# Patient Record
Sex: MALE | Race: WHITE | Employment: FULL TIME | URBAN - METROPOLITAN AREA
[De-identification: names, ages, dates, MRNs, and addresses within clinical notes are randomized per-mention and may not be internally consistent; named-entity substitution may affect disease eponyms.]

---

## 2017-03-21 ENCOUNTER — OFFICE VISIT (OUTPATIENT)
Dept: FAMILY MEDICINE CLINIC | Age: 52
End: 2017-03-21
Payer: COMMERCIAL

## 2017-03-21 VITALS
TEMPERATURE: 98.4 F | HEART RATE: 91 BPM | HEIGHT: 74 IN | DIASTOLIC BLOOD PRESSURE: 78 MMHG | BODY MASS INDEX: 27.46 KG/M2 | WEIGHT: 214 LBS | SYSTOLIC BLOOD PRESSURE: 136 MMHG

## 2017-03-21 DIAGNOSIS — E78.5 HYPERLIPIDEMIA, UNSPECIFIED HYPERLIPIDEMIA TYPE: ICD-10-CM

## 2017-03-21 DIAGNOSIS — J44.9 CHRONIC OBSTRUCTIVE PULMONARY DISEASE, UNSPECIFIED COPD TYPE (HCC): Primary | ICD-10-CM

## 2017-03-21 PROCEDURE — 99203 OFFICE O/P NEW LOW 30 MIN: CPT | Performed by: FAMILY MEDICINE

## 2017-03-21 RX ORDER — ALBUTEROL SULFATE 90 UG/1
2 AEROSOL, METERED RESPIRATORY (INHALATION) 2 TIMES DAILY PRN
Qty: 1 INHALER | Refills: 1 | Status: SHIPPED | OUTPATIENT
Start: 2017-03-21 | End: 2018-03-15 | Stop reason: SDUPTHER

## 2017-03-21 ASSESSMENT — PATIENT HEALTH QUESTIONNAIRE - PHQ9
2. FEELING DOWN, DEPRESSED OR HOPELESS: 0
SUM OF ALL RESPONSES TO PHQ QUESTIONS 1-9: 0
SUM OF ALL RESPONSES TO PHQ9 QUESTIONS 1 & 2: 0
1. LITTLE INTEREST OR PLEASURE IN DOING THINGS: 0

## 2017-03-21 ASSESSMENT — ENCOUNTER SYMPTOMS
SORE THROAT: 0
SHORTNESS OF BREATH: 1
NAUSEA: 0
ABDOMINAL PAIN: 0
CONSTIPATION: 0
COUGH: 0
WHEEZING: 0

## 2017-03-23 ENCOUNTER — HOSPITAL ENCOUNTER (OUTPATIENT)
Dept: PULMONOLOGY | Age: 52
Discharge: HOME | End: 2017-03-23
Payer: MEDICARE

## 2017-03-23 ENCOUNTER — HOSPITAL ENCOUNTER (OUTPATIENT)
Dept: GENERAL RADIOLOGY | Age: 52
Discharge: HOME | End: 2017-03-23
Payer: MEDICARE

## 2017-03-23 DIAGNOSIS — J44.9 CHRONIC OBSTRUCTIVE PULMONARY DISEASE, UNSPECIFIED COPD TYPE (HCC): ICD-10-CM

## 2017-03-23 DIAGNOSIS — E78.5 HYPERLIPIDEMIA, UNSPECIFIED HYPERLIPIDEMIA TYPE: ICD-10-CM

## 2017-03-23 LAB
ALT SERPL-CCNC: 27 U/L (ref 5–41)
ANION GAP SERPL CALCULATED.3IONS-SCNC: 15 MMOL/L (ref 9–17)
AST SERPL-CCNC: 20 U/L
BUN BLDV-MCNC: 16 MG/DL (ref 6–20)
BUN/CREAT BLD: ABNORMAL (ref 9–20)
CALCIUM SERPL-MCNC: 9.5 MG/DL (ref 8.6–10.4)
CHLORIDE BLD-SCNC: 100 MMOL/L (ref 98–107)
CHOLESTEROL/HDL RATIO: 6.1
CHOLESTEROL: 303 MG/DL
CO2: 26 MMOL/L (ref 20–31)
CREAT SERPL-MCNC: 0.95 MG/DL (ref 0.7–1.2)
GFR AFRICAN AMERICAN: >60 ML/MIN
GFR NON-AFRICAN AMERICAN: >60 ML/MIN
GFR SERPL CREATININE-BSD FRML MDRD: ABNORMAL ML/MIN/{1.73_M2}
GFR SERPL CREATININE-BSD FRML MDRD: ABNORMAL ML/MIN/{1.73_M2}
GLUCOSE BLD-MCNC: 117 MG/DL (ref 70–99)
HDLC SERPL-MCNC: 50 MG/DL
LDL CHOLESTEROL: 181 MG/DL (ref 0–130)
POTASSIUM SERPL-SCNC: 3.8 MMOL/L (ref 3.7–5.3)
SODIUM BLD-SCNC: 141 MMOL/L (ref 135–144)
TRIGL SERPL-MCNC: 362 MG/DL
VLDLC SERPL CALC-MCNC: ABNORMAL MG/DL (ref 1–30)

## 2017-03-23 PROCEDURE — 94727 GAS DIL/WSHOT DETER LNG VOL: CPT

## 2017-03-23 PROCEDURE — 80061 LIPID PANEL: CPT

## 2017-03-23 PROCEDURE — 84450 TRANSFERASE (AST) (SGOT): CPT

## 2017-03-23 PROCEDURE — 84460 ALANINE AMINO (ALT) (SGPT): CPT

## 2017-03-23 PROCEDURE — 94728 AIRWY RESIST BY OSCILLOMETRY: CPT

## 2017-03-23 PROCEDURE — 94060 EVALUATION OF WHEEZING: CPT

## 2017-03-23 PROCEDURE — 71020 XR CHEST STANDARD TWO VW: CPT

## 2017-03-23 PROCEDURE — 94726 PLETHYSMOGRAPHY LUNG VOLUMES: CPT

## 2017-03-23 PROCEDURE — 6360000002 HC RX W HCPCS: Performed by: FAMILY MEDICINE

## 2017-03-23 PROCEDURE — 94729 DIFFUSING CAPACITY: CPT

## 2017-03-23 PROCEDURE — 36415 COLL VENOUS BLD VENIPUNCTURE: CPT

## 2017-03-23 PROCEDURE — 80048 BASIC METABOLIC PNL TOTAL CA: CPT

## 2017-03-23 RX ORDER — ALBUTEROL SULFATE 2.5 MG/3ML
2.5 SOLUTION RESPIRATORY (INHALATION) ONCE
Status: COMPLETED | OUTPATIENT
Start: 2017-03-23 | End: 2017-03-23

## 2017-03-23 RX ADMIN — ALBUTEROL SULFATE 2.5 MG: 2.5 SOLUTION RESPIRATORY (INHALATION) at 09:56

## 2017-03-24 ENCOUNTER — TELEPHONE (OUTPATIENT)
Dept: FAMILY MEDICINE CLINIC | Age: 52
End: 2017-03-24

## 2017-03-27 ENCOUNTER — TELEPHONE (OUTPATIENT)
Dept: FAMILY MEDICINE CLINIC | Age: 52
End: 2017-03-27

## 2017-04-08 ENCOUNTER — OFFICE VISIT (OUTPATIENT)
Dept: FAMILY MEDICINE CLINIC | Age: 52
End: 2017-04-08
Payer: COMMERCIAL

## 2017-04-08 VITALS
HEART RATE: 88 BPM | SYSTOLIC BLOOD PRESSURE: 124 MMHG | WEIGHT: 211 LBS | HEIGHT: 74 IN | TEMPERATURE: 98 F | DIASTOLIC BLOOD PRESSURE: 62 MMHG | OXYGEN SATURATION: 98 % | BODY MASS INDEX: 27.08 KG/M2

## 2017-04-08 DIAGNOSIS — E78.5 HYPERLIPIDEMIA, UNSPECIFIED HYPERLIPIDEMIA TYPE: ICD-10-CM

## 2017-04-08 DIAGNOSIS — J45.20 MILD INTERMITTENT ASTHMA WITHOUT COMPLICATION: Primary | ICD-10-CM

## 2017-04-08 DIAGNOSIS — Z12.11 COLON CANCER SCREENING: ICD-10-CM

## 2017-04-08 PROBLEM — J45.30 MILD PERSISTENT ASTHMA WITHOUT COMPLICATION: Status: ACTIVE | Noted: 2017-04-08

## 2017-04-08 PROCEDURE — 99213 OFFICE O/P EST LOW 20 MIN: CPT | Performed by: FAMILY MEDICINE

## 2017-04-08 RX ORDER — BUDESONIDE AND FORMOTEROL FUMARATE DIHYDRATE 80; 4.5 UG/1; UG/1
2 AEROSOL RESPIRATORY (INHALATION) 2 TIMES DAILY
Qty: 1 INHALER | Refills: 0 | COMMUNITY
Start: 2017-04-08 | End: 2018-03-15 | Stop reason: SDUPTHER

## 2017-04-08 ASSESSMENT — PATIENT HEALTH QUESTIONNAIRE - PHQ9
SUM OF ALL RESPONSES TO PHQ9 QUESTIONS 1 & 2: 0
SUM OF ALL RESPONSES TO PHQ QUESTIONS 1-9: 0
1. LITTLE INTEREST OR PLEASURE IN DOING THINGS: 0
2. FEELING DOWN, DEPRESSED OR HOPELESS: 0

## 2017-04-08 ASSESSMENT — ENCOUNTER SYMPTOMS
WHEEZING: 0
ABDOMINAL PAIN: 0
COUGH: 0
SORE THROAT: 0
SHORTNESS OF BREATH: 1
NAUSEA: 0

## 2017-04-10 ENCOUNTER — TELEPHONE (OUTPATIENT)
Dept: FAMILY MEDICINE CLINIC | Age: 52
End: 2017-04-10

## 2017-04-10 DIAGNOSIS — Z12.11 COLON CANCER SCREENING: ICD-10-CM

## 2017-04-10 LAB
CONTROL: NORMAL
HEMOCCULT STL QL: NORMAL

## 2017-04-10 PROCEDURE — 82274 ASSAY TEST FOR BLOOD FECAL: CPT | Performed by: FAMILY MEDICINE

## 2017-05-13 ENCOUNTER — OFFICE VISIT (OUTPATIENT)
Dept: FAMILY MEDICINE CLINIC | Age: 52
End: 2017-05-13
Payer: COMMERCIAL

## 2017-05-13 VITALS
HEART RATE: 83 BPM | HEIGHT: 74 IN | WEIGHT: 217 LBS | BODY MASS INDEX: 27.85 KG/M2 | TEMPERATURE: 97.9 F | SYSTOLIC BLOOD PRESSURE: 124 MMHG | DIASTOLIC BLOOD PRESSURE: 62 MMHG

## 2017-05-13 DIAGNOSIS — E78.5 HYPERLIPIDEMIA, UNSPECIFIED HYPERLIPIDEMIA TYPE: ICD-10-CM

## 2017-05-13 DIAGNOSIS — J45.20 MILD INTERMITTENT ASTHMA WITHOUT COMPLICATION: Primary | ICD-10-CM

## 2017-05-13 PROCEDURE — 99213 OFFICE O/P EST LOW 20 MIN: CPT | Performed by: FAMILY MEDICINE

## 2017-05-13 RX ORDER — ATORVASTATIN CALCIUM 20 MG/1
20 TABLET, FILM COATED ORAL DAILY
COMMUNITY
End: 2018-03-15 | Stop reason: SDUPTHER

## 2017-05-13 RX ORDER — BUDESONIDE AND FORMOTEROL FUMARATE DIHYDRATE 80; 4.5 UG/1; UG/1
2 AEROSOL RESPIRATORY (INHALATION) 2 TIMES DAILY
Qty: 1 INHALER | Refills: 0 | COMMUNITY
Start: 2017-05-13 | End: 2018-08-21 | Stop reason: SDUPTHER

## 2017-05-13 ASSESSMENT — ENCOUNTER SYMPTOMS
ABDOMINAL PAIN: 0
WHEEZING: 0
CONSTIPATION: 0
NAUSEA: 0
SORE THROAT: 0
SHORTNESS OF BREATH: 0

## 2017-05-13 ASSESSMENT — PATIENT HEALTH QUESTIONNAIRE - PHQ9
SUM OF ALL RESPONSES TO PHQ QUESTIONS 1-9: 0
2. FEELING DOWN, DEPRESSED OR HOPELESS: 0
1. LITTLE INTEREST OR PLEASURE IN DOING THINGS: 0
SUM OF ALL RESPONSES TO PHQ9 QUESTIONS 1 & 2: 0

## 2017-09-09 ENCOUNTER — TELEPHONE (OUTPATIENT)
Dept: FAMILY MEDICINE CLINIC | Age: 52
End: 2017-09-09

## 2018-03-13 ENCOUNTER — HOSPITAL ENCOUNTER (EMERGENCY)
Age: 53
Discharge: HOME | End: 2018-03-13
Attending: EMERGENCY MEDICINE
Payer: MEDICARE

## 2018-03-13 VITALS
RESPIRATION RATE: 16 BRPM | WEIGHT: 226 LBS | SYSTOLIC BLOOD PRESSURE: 130 MMHG | BODY MASS INDEX: 29 KG/M2 | DIASTOLIC BLOOD PRESSURE: 85 MMHG | HEIGHT: 74 IN | HEART RATE: 98 BPM | OXYGEN SATURATION: 100 % | TEMPERATURE: 98.5 F

## 2018-03-13 DIAGNOSIS — M77.8 TENDINITIS OF RIGHT HAND: Primary | ICD-10-CM

## 2018-03-13 PROCEDURE — 99283 EMERGENCY DEPT VISIT LOW MDM: CPT

## 2018-03-13 RX ORDER — PREDNISONE 20 MG/1
20 TABLET ORAL 2 TIMES DAILY
Qty: 20 TABLET | Refills: 0 | Status: SHIPPED | OUTPATIENT
Start: 2018-03-13 | End: 2018-03-23

## 2018-03-13 RX ORDER — NAPROXEN 500 MG/1
500 TABLET ORAL 2 TIMES DAILY
Qty: 20 TABLET | Refills: 0 | Status: SHIPPED | OUTPATIENT
Start: 2018-03-13 | End: 2018-05-21 | Stop reason: ALTCHOICE

## 2018-03-13 ASSESSMENT — PAIN DESCRIPTION - ORIENTATION: ORIENTATION: LEFT

## 2018-03-13 ASSESSMENT — PAIN SCALES - GENERAL: PAINLEVEL_OUTOF10: 10

## 2018-03-13 ASSESSMENT — PAIN DESCRIPTION - LOCATION: LOCATION: HAND

## 2018-03-13 ASSESSMENT — PAIN DESCRIPTION - PAIN TYPE: TYPE: ACUTE PAIN

## 2018-03-15 ENCOUNTER — HOSPITAL ENCOUNTER (OUTPATIENT)
Dept: GENERAL RADIOLOGY | Facility: CLINIC | Age: 53
Discharge: HOME | End: 2018-03-17
Payer: MEDICARE

## 2018-03-15 ENCOUNTER — OFFICE VISIT (OUTPATIENT)
Dept: INTERNAL MEDICINE CLINIC | Age: 53
End: 2018-03-15
Payer: MEDICARE

## 2018-03-15 ENCOUNTER — HOSPITAL ENCOUNTER (OUTPATIENT)
Facility: CLINIC | Age: 53
Discharge: HOME | End: 2018-03-17
Payer: MEDICARE

## 2018-03-15 VITALS
HEIGHT: 74 IN | DIASTOLIC BLOOD PRESSURE: 98 MMHG | HEART RATE: 82 BPM | OXYGEN SATURATION: 96 % | WEIGHT: 227 LBS | BODY MASS INDEX: 29.13 KG/M2 | SYSTOLIC BLOOD PRESSURE: 138 MMHG

## 2018-03-15 DIAGNOSIS — M79.642 LEFT HAND PAIN: ICD-10-CM

## 2018-03-15 DIAGNOSIS — J45.20 MILD INTERMITTENT ASTHMA WITHOUT COMPLICATION: ICD-10-CM

## 2018-03-15 DIAGNOSIS — I10 ESSENTIAL HYPERTENSION: Primary | ICD-10-CM

## 2018-03-15 DIAGNOSIS — M65.322 TRIGGER INDEX FINGER OF LEFT HAND: ICD-10-CM

## 2018-03-15 DIAGNOSIS — E78.5 HYPERLIPIDEMIA, UNSPECIFIED HYPERLIPIDEMIA TYPE: ICD-10-CM

## 2018-03-15 DIAGNOSIS — Z12.11 COLON CANCER SCREENING: ICD-10-CM

## 2018-03-15 DIAGNOSIS — Z12.5 PROSTATE CANCER SCREENING: ICD-10-CM

## 2018-03-15 DIAGNOSIS — Z00.00 ANNUAL PHYSICAL EXAM: ICD-10-CM

## 2018-03-15 PROCEDURE — 73130 X-RAY EXAM OF HAND: CPT

## 2018-03-15 PROCEDURE — 3017F COLORECTAL CA SCREEN DOC REV: CPT | Performed by: INTERNAL MEDICINE

## 2018-03-15 PROCEDURE — 1036F TOBACCO NON-USER: CPT | Performed by: INTERNAL MEDICINE

## 2018-03-15 PROCEDURE — 99204 OFFICE O/P NEW MOD 45 MIN: CPT | Performed by: INTERNAL MEDICINE

## 2018-03-15 PROCEDURE — G8427 DOCREV CUR MEDS BY ELIG CLIN: HCPCS | Performed by: INTERNAL MEDICINE

## 2018-03-15 PROCEDURE — G8484 FLU IMMUNIZE NO ADMIN: HCPCS | Performed by: INTERNAL MEDICINE

## 2018-03-15 PROCEDURE — G8419 CALC BMI OUT NRM PARAM NOF/U: HCPCS | Performed by: INTERNAL MEDICINE

## 2018-03-15 RX ORDER — AMLODIPINE BESYLATE 5 MG/1
5 TABLET ORAL DAILY
Qty: 30 TABLET | Refills: 3 | Status: SHIPPED | OUTPATIENT
Start: 2018-03-15 | End: 2018-06-20 | Stop reason: SDUPTHER

## 2018-03-15 RX ORDER — ALBUTEROL SULFATE 90 UG/1
2 AEROSOL, METERED RESPIRATORY (INHALATION) 2 TIMES DAILY PRN
Qty: 1 INHALER | Refills: 5 | Status: SHIPPED | OUTPATIENT
Start: 2018-03-15 | End: 2018-08-12 | Stop reason: SDUPTHER

## 2018-03-15 RX ORDER — BUDESONIDE AND FORMOTEROL FUMARATE DIHYDRATE 80; 4.5 UG/1; UG/1
2 AEROSOL RESPIRATORY (INHALATION) 2 TIMES DAILY
Qty: 1 INHALER | Refills: 5 | Status: SHIPPED | OUTPATIENT
Start: 2018-03-15 | End: 2018-04-26 | Stop reason: SDUPTHER

## 2018-03-15 RX ORDER — MEDICAL SUPPLY, MISCELLANEOUS
1 EACH MISCELLANEOUS DAILY
Qty: 1 EACH | Refills: 0 | Status: SHIPPED | OUTPATIENT
Start: 2018-03-15 | End: 2018-04-26

## 2018-03-15 RX ORDER — ATORVASTATIN CALCIUM 20 MG/1
20 TABLET, FILM COATED ORAL DAILY
Qty: 30 TABLET | Refills: 11 | Status: SHIPPED | OUTPATIENT
Start: 2018-03-15 | End: 2018-03-16 | Stop reason: SDUPTHER

## 2018-03-15 ASSESSMENT — ENCOUNTER SYMPTOMS
COUGH: 0
CHEST TIGHTNESS: 0
APNEA: 0
ABDOMINAL DISTENTION: 0
ABDOMINAL PAIN: 0
CONSTIPATION: 0
SHORTNESS OF BREATH: 0
BACK PAIN: 0
WHEEZING: 0
DIARRHEA: 0
FACIAL SWELLING: 0
COLOR CHANGE: 0

## 2018-03-15 NOTE — PROGRESS NOTES
Normal range of motion. Neck supple. No JVD present. No tracheal deviation present. No thyromegaly present. Cardiovascular: Normal rate and normal heart sounds. Exam reveals no gallop. No murmur heard. Pulmonary/Chest: Effort normal and breath sounds normal. No stridor. No respiratory distress. He has no wheezes. He has no rales. Abdominal: Soft. Bowel sounds are normal. He exhibits no distension. There is no tenderness. There is no rebound and no guarding. Musculoskeletal: Normal range of motion. He exhibits tenderness (Present in Index finger and Middle Finger of Left Hand , Dorsiflexion of Left hand Wrist is painful ). He exhibits no edema. Neurological: He is alert and oriented to person, place, and time. Skin: Skin is warm and dry. No rash noted. He is not diaphoretic. No erythema. Nursing note and vitals reviewed. Assessment / Plan:   1. Mild intermittent asthma without complication    Patient had pulmonary function test done last year suggestive of reversibility  - budesonide-formoterol (SYMBICORT) 80-4.5 MCG/ACT AERO; Inhale 2 puffs into the lungs 2 times daily  Dispense: 1 Inhaler; Refill: 5  - albuterol sulfate  (90 Base) MCG/ACT inhaler; Inhale 2 puffs into the lungs 2 times daily as needed for Wheezing  Dispense: 1 Inhaler; Refill: 5    2. Essential hypertension, uncontrolled  - atorvastatin (LIPITOR) 20 MG tablet; Take 1 tablet by mouth daily  Dispense: 30 tablet; Refill: 11  - amLODIPine (NORVASC) 5 MG tablet; Take 1 tablet by mouth daily  Dispense: 30 tablet; Refill: 3  - Blood Pressure Monitoring (B-D ASSURE BPM/AUTO ARM CUFF) MISC; 1 Device by Does not apply route daily  Dispense: 1 each; Refill: 0    3. Hyperlipidemia, unspecified hyperlipidemia type    On Lipitor  - Lipid Panel; Future    4. Annual physical exam    - Hemoglobin A1C; Future    5. Colon cancer screening    - POCT Fecal Immunochemical Test (FIT); Future    6.  Prostate cancer screening    - PSA Screening; Future    7. Left hand pain     I called [address] , Patient will be seen by him Today Afternoon      Patient Agreed and Understood     8. Trigger index finger of left hand        · Return in about 6 weeks (around 4/26/2018). · Reviewed prior labs and health maintenance. · Discussed use, benefit, and side effects of prescribed medications. Barriers to medication compliance addressed. All patient questions answered. Pt voiced understanding.          MD MESFIN [facility]  3/15/2018, 9:46 AM

## 2018-03-16 ENCOUNTER — TELEPHONE (OUTPATIENT)
Dept: INTERNAL MEDICINE CLINIC | Age: 53
End: 2018-03-16

## 2018-03-16 ENCOUNTER — HOSPITAL ENCOUNTER (OUTPATIENT)
Age: 53
Setting detail: SPECIMEN
Discharge: HOME | End: 2018-03-16
Payer: MEDICARE

## 2018-03-16 DIAGNOSIS — E78.5 HYPERLIPIDEMIA, UNSPECIFIED HYPERLIPIDEMIA TYPE: Primary | ICD-10-CM

## 2018-03-16 DIAGNOSIS — I10 ESSENTIAL HYPERTENSION: ICD-10-CM

## 2018-03-16 DIAGNOSIS — Z12.5 PROSTATE CANCER SCREENING: ICD-10-CM

## 2018-03-16 DIAGNOSIS — E78.5 HYPERLIPIDEMIA, UNSPECIFIED HYPERLIPIDEMIA TYPE: ICD-10-CM

## 2018-03-16 DIAGNOSIS — Z00.00 ANNUAL PHYSICAL EXAM: ICD-10-CM

## 2018-03-16 LAB
CHOLESTEROL/HDL RATIO: 5.8
CHOLESTEROL: 306 MG/DL
ESTIMATED AVERAGE GLUCOSE: 140 MG/DL
HBA1C MFR BLD: 6.5 % (ref 4–6)
HDLC SERPL-MCNC: 53 MG/DL
LDL CHOLESTEROL: 186 MG/DL (ref 0–130)
PROSTATE SPECIFIC ANTIGEN: 0.32 UG/L
TRIGL SERPL-MCNC: 335 MG/DL
VLDLC SERPL CALC-MCNC: ABNORMAL MG/DL (ref 1–30)

## 2018-03-16 RX ORDER — ATORVASTATIN CALCIUM 40 MG/1
40 TABLET, FILM COATED ORAL DAILY
Qty: 30 TABLET | Refills: 3 | Status: SHIPPED | OUTPATIENT
Start: 2018-03-16 | End: 2018-10-02 | Stop reason: SDUPTHER

## 2018-03-21 DIAGNOSIS — Z12.11 COLON CANCER SCREENING: ICD-10-CM

## 2018-03-21 LAB
CONTROL: PRESENT
HEMOCCULT STL QL: NEGATIVE

## 2018-03-21 PROCEDURE — 82274 ASSAY TEST FOR BLOOD FECAL: CPT | Performed by: INTERNAL MEDICINE

## 2018-04-05 ENCOUNTER — HOSPITAL ENCOUNTER (EMERGENCY)
Age: 53
Discharge: HOME | End: 2018-04-05
Attending: EMERGENCY MEDICINE
Payer: COMMERCIAL

## 2018-04-05 VITALS
RESPIRATION RATE: 16 BRPM | OXYGEN SATURATION: 99 % | SYSTOLIC BLOOD PRESSURE: 143 MMHG | HEART RATE: 94 BPM | DIASTOLIC BLOOD PRESSURE: 90 MMHG | HEIGHT: 75 IN | WEIGHT: 227 LBS | BODY MASS INDEX: 28.23 KG/M2 | TEMPERATURE: 97.8 F

## 2018-04-05 DIAGNOSIS — G89.18 POST-OP PAIN: Primary | ICD-10-CM

## 2018-04-05 PROCEDURE — 99283 EMERGENCY DEPT VISIT LOW MDM: CPT

## 2018-04-05 PROCEDURE — 6370000000 HC RX 637 (ALT 250 FOR IP): Performed by: PHYSICIAN ASSISTANT

## 2018-04-05 RX ORDER — CEPHALEXIN 250 MG/1
500 CAPSULE ORAL ONCE
Status: COMPLETED | OUTPATIENT
Start: 2018-04-05 | End: 2018-04-05

## 2018-04-05 RX ORDER — CEPHALEXIN 500 MG/1
500 CAPSULE ORAL 4 TIMES DAILY
Qty: 28 CAPSULE | Refills: 0 | Status: SHIPPED | OUTPATIENT
Start: 2018-04-05 | End: 2018-04-12

## 2018-04-05 RX ORDER — HYDROCODONE BITARTRATE AND ACETAMINOPHEN 5; 325 MG/1; MG/1
1 TABLET ORAL EVERY 6 HOURS PRN
Qty: 10 TABLET | Refills: 0 | Status: SHIPPED | OUTPATIENT
Start: 2018-04-05 | End: 2018-04-12

## 2018-04-05 RX ADMIN — CEPHALEXIN 500 MG: 250 CAPSULE ORAL at 19:51

## 2018-04-05 ASSESSMENT — PAIN SCALES - GENERAL: PAINLEVEL_OUTOF10: 5

## 2018-04-05 ASSESSMENT — PAIN DESCRIPTION - LOCATION: LOCATION: HAND

## 2018-04-05 ASSESSMENT — PAIN SCALES - WONG BAKER: WONGBAKER_NUMERICALRESPONSE: 4

## 2018-04-05 ASSESSMENT — PAIN DESCRIPTION - PAIN TYPE: TYPE: ACUTE PAIN

## 2018-04-14 PROBLEM — Z00.00 ANNUAL PHYSICAL EXAM: Status: RESOLVED | Noted: 2018-03-15 | Resolved: 2018-04-14

## 2018-04-14 PROBLEM — Z12.11 COLON CANCER SCREENING: Status: RESOLVED | Noted: 2018-03-15 | Resolved: 2018-04-14

## 2018-04-14 PROBLEM — Z12.5 PROSTATE CANCER SCREENING: Status: RESOLVED | Noted: 2018-03-15 | Resolved: 2018-04-14

## 2018-04-26 ENCOUNTER — OFFICE VISIT (OUTPATIENT)
Dept: INTERNAL MEDICINE CLINIC | Age: 53
End: 2018-04-26
Payer: MEDICARE

## 2018-04-26 VITALS
HEART RATE: 84 BPM | BODY MASS INDEX: 27.85 KG/M2 | WEIGHT: 224 LBS | SYSTOLIC BLOOD PRESSURE: 118 MMHG | HEIGHT: 75 IN | DIASTOLIC BLOOD PRESSURE: 74 MMHG

## 2018-04-26 DIAGNOSIS — E11.8 TYPE 2 DIABETES MELLITUS WITH COMPLICATION, UNSPECIFIED LONG TERM INSULIN USE STATUS: Primary | ICD-10-CM

## 2018-04-26 DIAGNOSIS — F51.01 PRIMARY INSOMNIA: ICD-10-CM

## 2018-04-26 DIAGNOSIS — K21.00 GASTROESOPHAGEAL REFLUX DISEASE WITH ESOPHAGITIS: ICD-10-CM

## 2018-04-26 DIAGNOSIS — I10 ESSENTIAL HYPERTENSION: ICD-10-CM

## 2018-04-26 DIAGNOSIS — E78.5 HYPERLIPIDEMIA, UNSPECIFIED HYPERLIPIDEMIA TYPE: ICD-10-CM

## 2018-04-26 DIAGNOSIS — J45.20 MILD INTERMITTENT ASTHMA WITHOUT COMPLICATION: ICD-10-CM

## 2018-04-26 PROCEDURE — 3044F HG A1C LEVEL LT 7.0%: CPT | Performed by: INTERNAL MEDICINE

## 2018-04-26 PROCEDURE — 99214 OFFICE O/P EST MOD 30 MIN: CPT | Performed by: INTERNAL MEDICINE

## 2018-04-26 PROCEDURE — G8417 CALC BMI ABV UP PARAM F/U: HCPCS | Performed by: INTERNAL MEDICINE

## 2018-04-26 PROCEDURE — 3017F COLORECTAL CA SCREEN DOC REV: CPT | Performed by: INTERNAL MEDICINE

## 2018-04-26 PROCEDURE — G8427 DOCREV CUR MEDS BY ELIG CLIN: HCPCS | Performed by: INTERNAL MEDICINE

## 2018-04-26 PROCEDURE — 1036F TOBACCO NON-USER: CPT | Performed by: INTERNAL MEDICINE

## 2018-04-26 PROCEDURE — 2022F DILAT RTA XM EVC RTNOPTHY: CPT | Performed by: INTERNAL MEDICINE

## 2018-04-26 RX ORDER — LANCETS 30 GAUGE
1 EACH MISCELLANEOUS DAILY
Qty: 100 EACH | Refills: 11 | Status: SHIPPED | OUTPATIENT
Start: 2018-04-26 | End: 2018-05-21 | Stop reason: SDUPTHER

## 2018-04-26 RX ORDER — GLUCOSAMINE HCL/CHONDROITIN SU 500-400 MG
1 CAPSULE ORAL 2 TIMES DAILY
Qty: 100 STRIP | Refills: 11 | Status: SHIPPED | OUTPATIENT
Start: 2018-04-26 | End: 2020-05-08

## 2018-04-26 RX ORDER — TRAZODONE HYDROCHLORIDE 50 MG/1
50 TABLET ORAL NIGHTLY
Qty: 30 TABLET | Refills: 3 | Status: SHIPPED | OUTPATIENT
Start: 2018-04-26 | End: 2018-06-26 | Stop reason: SDUPTHER

## 2018-04-26 RX ORDER — ASPIRIN 81 MG/1
81 TABLET, CHEWABLE ORAL DAILY
Qty: 30 TABLET | Refills: 3 | Status: SHIPPED | OUTPATIENT
Start: 2018-04-26 | End: 2018-07-31 | Stop reason: SDUPTHER

## 2018-04-26 RX ORDER — FAMOTIDINE 20 MG/1
20 TABLET, FILM COATED ORAL 2 TIMES DAILY
Qty: 60 TABLET | Refills: 3 | Status: SHIPPED | OUTPATIENT
Start: 2018-04-26 | End: 2018-07-30 | Stop reason: SDUPTHER

## 2018-04-26 RX ORDER — BLOOD-GLUCOSE METER
1 KIT MISCELLANEOUS DAILY
Qty: 1 KIT | Refills: 0 | Status: SHIPPED | OUTPATIENT
Start: 2018-04-26

## 2018-04-26 RX ORDER — LANCETS 30 GAUGE
1 EACH MISCELLANEOUS DAILY
Qty: 100 EACH | Refills: 11 | Status: SHIPPED | OUTPATIENT
Start: 2018-04-26 | End: 2021-01-07 | Stop reason: SDUPTHER

## 2018-04-26 ASSESSMENT — ENCOUNTER SYMPTOMS
SHORTNESS OF BREATH: 0
CONSTIPATION: 0
FACIAL SWELLING: 0
CHEST TIGHTNESS: 0
ABDOMINAL PAIN: 0
COUGH: 0
DIARRHEA: 0
WHEEZING: 0
ABDOMINAL DISTENTION: 0
COLOR CHANGE: 0
APNEA: 0
BACK PAIN: 0

## 2018-05-03 ENCOUNTER — HOSPITAL ENCOUNTER (OUTPATIENT)
Age: 53
Setting detail: SPECIMEN
Discharge: HOME | End: 2018-05-03
Payer: MEDICARE

## 2018-05-03 DIAGNOSIS — E11.8 TYPE 2 DIABETES MELLITUS WITH COMPLICATION, UNSPECIFIED LONG TERM INSULIN USE STATUS: ICD-10-CM

## 2018-05-03 LAB
ALBUMIN SERPL-MCNC: 4.4 G/DL (ref 3.5–5.2)
ALBUMIN/GLOBULIN RATIO: 1.4 (ref 1–2.5)
ALP BLD-CCNC: 96 U/L (ref 40–129)
ALT SERPL-CCNC: 43 U/L (ref 5–41)
ANION GAP SERPL CALCULATED.3IONS-SCNC: 13 MMOL/L (ref 9–17)
AST SERPL-CCNC: 27 U/L
BILIRUB SERPL-MCNC: 0.53 MG/DL (ref 0.3–1.2)
BUN BLDV-MCNC: 22 MG/DL (ref 6–20)
BUN/CREAT BLD: ABNORMAL (ref 9–20)
CALCIUM SERPL-MCNC: 9.1 MG/DL (ref 8.6–10.4)
CHLORIDE BLD-SCNC: 103 MMOL/L (ref 98–107)
CO2: 23 MMOL/L (ref 20–31)
CREAT SERPL-MCNC: 0.84 MG/DL (ref 0.7–1.2)
GFR AFRICAN AMERICAN: >60 ML/MIN
GFR NON-AFRICAN AMERICAN: >60 ML/MIN
GFR SERPL CREATININE-BSD FRML MDRD: ABNORMAL ML/MIN/{1.73_M2}
GFR SERPL CREATININE-BSD FRML MDRD: ABNORMAL ML/MIN/{1.73_M2}
GLUCOSE BLD-MCNC: 93 MG/DL (ref 70–99)
POTASSIUM SERPL-SCNC: 4.3 MMOL/L (ref 3.7–5.3)
SODIUM BLD-SCNC: 139 MMOL/L (ref 135–144)
TOTAL PROTEIN: 7.6 G/DL (ref 6.4–8.3)

## 2018-05-05 ENCOUNTER — HOSPITAL ENCOUNTER (EMERGENCY)
Age: 53
Discharge: HOME | End: 2018-05-05
Attending: EMERGENCY MEDICINE
Payer: COMMERCIAL

## 2018-05-05 VITALS
HEIGHT: 75 IN | WEIGHT: 224 LBS | SYSTOLIC BLOOD PRESSURE: 130 MMHG | OXYGEN SATURATION: 97 % | HEART RATE: 84 BPM | BODY MASS INDEX: 27.85 KG/M2 | TEMPERATURE: 98 F | DIASTOLIC BLOOD PRESSURE: 88 MMHG | RESPIRATION RATE: 16 BRPM

## 2018-05-05 DIAGNOSIS — R60.9 PERIPHERAL EDEMA: Primary | ICD-10-CM

## 2018-05-05 PROCEDURE — 99283 EMERGENCY DEPT VISIT LOW MDM: CPT

## 2018-05-05 ASSESSMENT — PAIN SCALES - GENERAL: PAINLEVEL_OUTOF10: 0

## 2018-05-06 ASSESSMENT — ENCOUNTER SYMPTOMS
COLOR CHANGE: 0
SHORTNESS OF BREATH: 0
COUGH: 0

## 2018-05-21 ENCOUNTER — HOSPITAL ENCOUNTER (OUTPATIENT)
Dept: DIABETES SERVICES | Age: 53
Setting detail: THERAPIES SERIES
Discharge: HOME | End: 2018-05-21
Payer: COMMERCIAL

## 2018-05-21 VITALS
SYSTOLIC BLOOD PRESSURE: 123 MMHG | HEIGHT: 75 IN | BODY MASS INDEX: 28.01 KG/M2 | WEIGHT: 225.31 LBS | DIASTOLIC BLOOD PRESSURE: 83 MMHG | HEART RATE: 76 BPM

## 2018-05-21 PROCEDURE — G0108 DIAB MANAGE TRN  PER INDIV: HCPCS

## 2018-06-08 ENCOUNTER — HOSPITAL ENCOUNTER (OUTPATIENT)
Dept: DIABETES SERVICES | Age: 53
Setting detail: THERAPIES SERIES
Discharge: HOME | End: 2018-06-08
Payer: COMMERCIAL

## 2018-06-08 DIAGNOSIS — E11.8 TYPE 2 DIABETES MELLITUS WITH COMPLICATION, WITHOUT LONG-TERM CURRENT USE OF INSULIN (HCC): Primary | ICD-10-CM

## 2018-06-08 PROCEDURE — G0108 DIAB MANAGE TRN  PER INDIV: HCPCS

## 2018-06-20 DIAGNOSIS — I10 ESSENTIAL HYPERTENSION: ICD-10-CM

## 2018-06-21 RX ORDER — AMLODIPINE BESYLATE 5 MG/1
5 TABLET ORAL DAILY
Qty: 30 TABLET | Refills: 0 | Status: SHIPPED | OUTPATIENT
Start: 2018-06-21 | End: 2018-06-26 | Stop reason: ALTCHOICE

## 2018-06-26 ENCOUNTER — OFFICE VISIT (OUTPATIENT)
Dept: INTERNAL MEDICINE CLINIC | Age: 53
End: 2018-06-26
Payer: MEDICARE

## 2018-06-26 VITALS
WEIGHT: 218 LBS | DIASTOLIC BLOOD PRESSURE: 78 MMHG | BODY MASS INDEX: 27.1 KG/M2 | HEIGHT: 75 IN | HEART RATE: 91 BPM | SYSTOLIC BLOOD PRESSURE: 112 MMHG

## 2018-06-26 DIAGNOSIS — E11.9 TYPE 2 DIABETES MELLITUS WITHOUT COMPLICATION, WITHOUT LONG-TERM CURRENT USE OF INSULIN (HCC): ICD-10-CM

## 2018-06-26 DIAGNOSIS — F51.01 PRIMARY INSOMNIA: ICD-10-CM

## 2018-06-26 DIAGNOSIS — R60.0 PEDAL EDEMA: ICD-10-CM

## 2018-06-26 DIAGNOSIS — I10 ESSENTIAL HYPERTENSION: ICD-10-CM

## 2018-06-26 DIAGNOSIS — E78.5 HYPERLIPIDEMIA, UNSPECIFIED HYPERLIPIDEMIA TYPE: Primary | ICD-10-CM

## 2018-06-26 PROCEDURE — 3044F HG A1C LEVEL LT 7.0%: CPT | Performed by: INTERNAL MEDICINE

## 2018-06-26 PROCEDURE — G8427 DOCREV CUR MEDS BY ELIG CLIN: HCPCS | Performed by: INTERNAL MEDICINE

## 2018-06-26 PROCEDURE — 2022F DILAT RTA XM EVC RTNOPTHY: CPT | Performed by: INTERNAL MEDICINE

## 2018-06-26 PROCEDURE — 3017F COLORECTAL CA SCREEN DOC REV: CPT | Performed by: INTERNAL MEDICINE

## 2018-06-26 PROCEDURE — 99214 OFFICE O/P EST MOD 30 MIN: CPT | Performed by: INTERNAL MEDICINE

## 2018-06-26 PROCEDURE — G8417 CALC BMI ABV UP PARAM F/U: HCPCS | Performed by: INTERNAL MEDICINE

## 2018-06-26 PROCEDURE — 1036F TOBACCO NON-USER: CPT | Performed by: INTERNAL MEDICINE

## 2018-06-26 RX ORDER — LISINOPRIL 10 MG/1
10 TABLET ORAL DAILY
Qty: 30 TABLET | Refills: 3 | Status: SHIPPED | OUTPATIENT
Start: 2018-06-26 | End: 2018-10-11 | Stop reason: SDUPTHER

## 2018-06-26 RX ORDER — ATORVASTATIN CALCIUM 20 MG/1
TABLET, FILM COATED ORAL
Refills: 9 | COMMUNITY
Start: 2018-05-26 | End: 2018-06-26

## 2018-06-26 RX ORDER — TRAZODONE HYDROCHLORIDE 100 MG/1
100 TABLET ORAL NIGHTLY
Qty: 30 TABLET | Refills: 3 | Status: SHIPPED | OUTPATIENT
Start: 2018-06-26 | End: 2018-10-11 | Stop reason: SDUPTHER

## 2018-06-26 ASSESSMENT — PATIENT HEALTH QUESTIONNAIRE - PHQ9
SUM OF ALL RESPONSES TO PHQ9 QUESTIONS 1 & 2: 0
1. LITTLE INTEREST OR PLEASURE IN DOING THINGS: 0
SUM OF ALL RESPONSES TO PHQ QUESTIONS 1-9: 0
2. FEELING DOWN, DEPRESSED OR HOPELESS: 0

## 2018-06-26 ASSESSMENT — ENCOUNTER SYMPTOMS
COLOR CHANGE: 0
APNEA: 0
DIARRHEA: 0
BACK PAIN: 0
CONSTIPATION: 0
ABDOMINAL DISTENTION: 0
CHEST TIGHTNESS: 0
COUGH: 0
SHORTNESS OF BREATH: 0
WHEEZING: 0
FACIAL SWELLING: 0
ABDOMINAL PAIN: 0

## 2018-07-03 ENCOUNTER — HOSPITAL ENCOUNTER (OUTPATIENT)
Dept: DIABETES SERVICES | Age: 53
Setting detail: THERAPIES SERIES
Discharge: HOME | End: 2018-07-03
Payer: MEDICARE

## 2018-07-03 DIAGNOSIS — E11.8 TYPE 2 DIABETES MELLITUS WITH COMPLICATION, WITHOUT LONG-TERM CURRENT USE OF INSULIN (HCC): Primary | ICD-10-CM

## 2018-07-03 PROCEDURE — G0108 DIAB MANAGE TRN  PER INDIV: HCPCS

## 2018-07-03 NOTE — PROGRESS NOTES
Diabetes Self- Management Education Program     Diet History :  Diet Questionnaire and typical meal /portion sheet completed  [x]Yes  [] NO    Goals setting:  Goal work sheet completed  [x]Yes  [] NO  (SEE  EDUCATION AND GOALS FLOWSHEET)    MEDICAL HISTORY:  Past Medical History:   Diagnosis Date    Asthma     GERD (gastroesophageal reflux disease)     Hyperlipidemia      Family History   Problem Relation Age of Onset    Adopted: Yes    Other Mother         JOINT ISSUES    Diabetes Mother     Heart Disease Mother     No Known Problems Father      Morphine and Codeine     There is no immunization history on file for this patient. Current Medications  Current Outpatient Prescriptions   Medication Sig Dispense Refill    traZODone (DESYREL) 100 MG tablet Take 1 tablet by mouth nightly 30 tablet 3    lisinopril (PRINIVIL;ZESTRIL) 10 MG tablet Take 1 tablet by mouth daily 30 tablet 3    metFORMIN (GLUCOPHAGE) 500 MG tablet Take 1 tablet by mouth 2 times daily (with meals) 60 tablet 3    famotidine (PEPCID) 20 MG tablet Take 1 tablet by mouth 2 times daily 60 tablet 3    aspirin (ASPIRIN CHILDRENS) 81 MG chewable tablet Take 1 tablet by mouth daily 30 tablet 3    Glucose Blood (BLOOD GLUCOSE TEST STRIPS) STRP 1 Device by In Vitro route 2 times daily 100 strip 11    Lancets MISC 1 each by Does not apply route daily 100 each 11    glucose blood VI test strips (ASCENSIA AUTODISC VI;ONE TOUCH ULTRA TEST VI) strip 1 each by Other route daily Use with associated glucose meter.  100 strip 11    glucose monitoring kit (FREESTYLE) monitoring kit 1 kit by Does not apply route daily 1 kit 0    atorvastatin (LIPITOR) 40 MG tablet Take 1 tablet by mouth daily 30 tablet 3    albuterol sulfate  (90 Base) MCG/ACT inhaler Inhale 2 puffs into the lungs 2 times daily as needed for Wheezing 1 Inhaler 5    budesonide-formoterol (SYMBICORT) 80-4.5 MCG/ACT AERO Inhale 2 puffs into the lungs 2 times daily 1 Inhaler 0 groups, When to eat - timing of meals and snacks, and How much to eat - portions control. 6/8/18- cut back on portions and eating smaller meals and add in snacks  2200 calories/ day   CHO choices/ meal  4,2,4,2,4,2   CHO choices/  day   grams of protein /day   gram of fat /day     Correctly read food labels & demonstrate CHO counting & portion control with personalized meal plan. Identify dining out strategies, & dietary sick day guidelines. 1 7/3/18 JW      Incorporating physical activity into lifestyle:   Verbalize effect of exercise on blood glucose levels; benefits of regular exercise; safety considerations; contraindications; maintenance of activity. 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 5/21/18CB   Using medications safely:  Identify effects of diabetes medicines on blood glucose levels; List diabetes medication taken, action & side effects;    1    Metformin 500mg 2x/d   Insulin / Injectable - Appropriate injection sites; proper storage; supplies needed; proper technique; safe needle disposal guidelines. 1       Monitoring blood glucose, interpreting and using results:  Identify recommended & personal blood glucose targets; importance of testing; testing supplies; HgbA1C target levels; Factors affecting blood glucose; Importance of logging blood glucose levels for pattern recognition; ketone testing; safe lancet disposal.   1 6/8/18RS   Testing 2x/d BG range 102-212, was 119 this am. A1C 6.5% on 3/16/18    One touch - discussed how to tag bg and BG target ranges and lower cost BGs strips-6/8/18RS   Prevention, detection & treatment of acute complications:  Identify symptoms of hyper & hypoglycemia, and prevention & treatment strategies. 1 6/8/18RS   Did notice some increased urination and dry mouth. Was consuming 1 gallon milk / day. Describe sick day guidelines & indications for  physician notification. Conversion and [address] Eating for People with Diabetes and Nutrition in the [employer] - fast facts about fast food7/3/18 JW  [] Self-Management  Class 3 -  Diabetes ID card,  foot care tips sheet, Healthy I  Continuing Your Journey with Diabetes map handout, Individualized Diabetes report card    [] Self-Management Class 4 - BD Booklet  Sick Day Rules and  Dinning Out Guide , recipe hand outs and tips, diabetes Cookbooks  ( when available)     []Self-Management - 3 month follow - up  AADE booklet Side by [employer] [place] a partnership approach to diabetes self- care, [place] Tobacco Quit line, [facility] Diabetes support program information sheet, [facility] information sheet       []Self-Management  Gestational - RN class -Resource materials provided today include educational self care booklet - \" Gestational Diabetes - A Healthy pregnancy and beyond\"  with SMGB and 3 small meals and 3 small snacks diet plan. SMBG sheets to fax back to M weekly.  [facility] guidelines for SMGB and blood glucose log sheets, Never too early to prevent diabetes handout from [facility]      []Self-Management Gestational - RD class - My Food Plan for Gestational diabetes    []Glucose Meter     []Insulin Kit     []Other      Encounter Type Date Start Time End Time Comments No Show Dates   Assessment 5/21/18CB   8:20 9:30   [x]In Person  []Telephone    Class 1 - Understanding diabetes 6/8/18RS  8 30   9 30 x    Class 2- Nutrition and diabetes   7/3/18 JW 8:30 9:30 x    Class 3 - Preventing Complications         Class 4 -  In depth Nutrition and sick day care        Class 5 - 3 month follow up / goal reassessment        Gestational - RN         Gestational - RD        Individual MNT         Shared Med Appt         Yearly Follow-up        Meter Instrx        Insulin Instrx      []Pen  []Vial & Syringe      DSMS Support Plan:  Follow-up plan:     [] MNT referral request / Appointment     [] Annual update

## 2018-07-15 DIAGNOSIS — I10 ESSENTIAL HYPERTENSION: ICD-10-CM

## 2018-07-16 RX ORDER — AMLODIPINE BESYLATE 5 MG/1
5 TABLET ORAL DAILY
Qty: 30 TABLET | Refills: 3 | Status: SHIPPED | OUTPATIENT
Start: 2018-07-16 | End: 2018-10-12 | Stop reason: SINTOL

## 2018-07-24 ENCOUNTER — HOSPITAL ENCOUNTER (OUTPATIENT)
Dept: DIABETES SERVICES | Age: 53
Setting detail: THERAPIES SERIES
Discharge: HOME | End: 2018-07-24
Payer: MEDICARE

## 2018-07-24 VITALS — WEIGHT: 204.81 LBS | BODY MASS INDEX: 25.6 KG/M2

## 2018-07-24 PROCEDURE — G0108 DIAB MANAGE TRN  PER INDIV: HCPCS

## 2018-07-30 DIAGNOSIS — K21.00 GASTROESOPHAGEAL REFLUX DISEASE WITH ESOPHAGITIS: ICD-10-CM

## 2018-07-30 RX ORDER — FAMOTIDINE 20 MG/1
20 TABLET, FILM COATED ORAL 2 TIMES DAILY
Qty: 60 TABLET | Refills: 5 | Status: SHIPPED | OUTPATIENT
Start: 2018-07-30 | End: 2018-07-31 | Stop reason: SDUPTHER

## 2018-07-30 NOTE — TELEPHONE ENCOUNTER
Medication: famotadine  Last visit: 06/26/18  Next visit: 9/26/2018  Last refill: 04/26/18  [facility]    Patient states he called a few weeks ago for refill.   Please send asap

## 2018-07-31 DIAGNOSIS — E11.8 TYPE 2 DIABETES MELLITUS WITH COMPLICATION, UNSPECIFIED LONG TERM INSULIN USE STATUS: ICD-10-CM

## 2018-07-31 DIAGNOSIS — K21.00 GASTROESOPHAGEAL REFLUX DISEASE WITH ESOPHAGITIS: ICD-10-CM

## 2018-07-31 RX ORDER — ASPIRIN 81 MG/1
81 TABLET, CHEWABLE ORAL DAILY
Qty: 30 TABLET | Refills: 3 | Status: SHIPPED | OUTPATIENT
Start: 2018-07-31 | End: 2018-12-05 | Stop reason: SDUPTHER

## 2018-07-31 RX ORDER — FAMOTIDINE 20 MG/1
20 TABLET, FILM COATED ORAL 2 TIMES DAILY
Qty: 60 TABLET | Refills: 5 | Status: SHIPPED | OUTPATIENT
Start: 2018-07-31 | End: 2021-05-21

## 2018-07-31 NOTE — TELEPHONE ENCOUNTER
Medication: famotidine  asa  Last visit: 06/26/18  Next visit: 9/26/2018  Last refill: 07/3.0/18 but to the wrong pharmacy  [facility]

## 2018-08-12 DIAGNOSIS — E11.8 TYPE 2 DIABETES MELLITUS WITH COMPLICATION (HCC): ICD-10-CM

## 2018-08-12 DIAGNOSIS — J45.20 MILD INTERMITTENT ASTHMA WITHOUT COMPLICATION: ICD-10-CM

## 2018-08-22 RX ORDER — DILTIAZEM HYDROCHLORIDE 60 MG/1
TABLET, FILM COATED ORAL
Qty: 10.2 G | Refills: 5 | Status: SHIPPED | OUTPATIENT
Start: 2018-08-22 | End: 2019-02-06

## 2018-09-05 DIAGNOSIS — E11.8 TYPE 2 DIABETES MELLITUS WITH COMPLICATION (HCC): ICD-10-CM

## 2018-09-06 ENCOUNTER — HOSPITAL ENCOUNTER (OUTPATIENT)
Dept: DIABETES SERVICES | Age: 53
Setting detail: THERAPIES SERIES
Discharge: HOME | End: 2018-09-06
Payer: COMMERCIAL

## 2018-09-06 VITALS — WEIGHT: 196 LBS | BODY MASS INDEX: 24.5 KG/M2

## 2018-09-06 DIAGNOSIS — E11.8 TYPE 2 DIABETES MELLITUS WITH COMPLICATION, WITHOUT LONG-TERM CURRENT USE OF INSULIN (HCC): Primary | ICD-10-CM

## 2018-09-06 PROCEDURE — G0108 DIAB MANAGE TRN  PER INDIV: HCPCS

## 2018-09-06 NOTE — PROGRESS NOTES
by mouth daily 30 tablet 3     No current facility-administered medications for this encounter.    :     Comments:  Allergies: Allergies   Allergen Reactions    Morphine Other (See Comments)     Lock jaw    Codeine Nausea And Vomiting     Diabetes 5  / Health Status    A1C blood level - at goal < 7%   Lab Results   Component Value Date    LABA1C 6.5 (H) 03/16/2018     Lab Results   Component Value Date    CREATININE 0.84 05/03/2018       Blood pressure ( 130/ 80)  Or less  BP Readings from Last 3 Encounters:   06/26/18 112/78   05/21/18 123/83   05/05/18 130/88        Cholesterol ( LDL under  100)   Lab Results   Component Value Date    LDLCHOLESTEROL 186 (H) 03/16/2018       4 . Smoking ? []Yes   [x]No    5. Taking an Asprin daily? [x]Yes   []No          Diabetes Self- Management Education Record    Participant Name: Beth Fitzgerald  Referring Provider: Augustin Lazaro MD   Assessment/Evaluation Ratings:  1=Needs Instruction   4=Demonstrates Understanding/Competency  2=Needs Review   NC=Not Covered    3=Comprehends Key Points  N/A=Not Applicable  Topics/Learning Objectives Pre-session Assess Date:  5/21/18CB Instr. Date Reinforce Date Post- session Eval Comments   Diabetes disease process & Treatment process: Define diabetes & pre-diabetes; Identify own type of diabetes; role of the pancreas; signs/symptoms; diagnostic criteria; prevention & treatment options; contributing factors. 1 6/8/18RS   New Dx after going in for pre testing for hand surgery. Family Hx with mom, brother and sister . 5/21/18CB   Incorporating nutritional management into lifestyle: Describe effect of type, amount & timing of food on blood glucose;  Describe basic meal planning techniques & current nutrition guideline   1up early 4-5a but does not eat until 8a, was drinking a lot of milk but  Mainly water, unsweeted tea, Coke 0,1 can beer after work, eats fast food on way to work and at [employer] at work    5/21/18CB 7/3/18 uAgustin Lazaro- works 2nd

## 2018-10-02 DIAGNOSIS — E78.5 HYPERLIPIDEMIA, UNSPECIFIED HYPERLIPIDEMIA TYPE: ICD-10-CM

## 2018-10-02 DIAGNOSIS — I10 ESSENTIAL HYPERTENSION: ICD-10-CM

## 2018-10-02 RX ORDER — ATORVASTATIN CALCIUM 40 MG/1
TABLET, FILM COATED ORAL
Qty: 30 TABLET | Refills: 3 | Status: SHIPPED | OUTPATIENT
Start: 2018-10-02 | End: 2018-10-12 | Stop reason: SDUPTHER

## 2018-10-11 DIAGNOSIS — F51.01 PRIMARY INSOMNIA: ICD-10-CM

## 2018-10-11 DIAGNOSIS — I10 ESSENTIAL HYPERTENSION: ICD-10-CM

## 2018-10-11 RX ORDER — TRAZODONE HYDROCHLORIDE 100 MG/1
TABLET ORAL
Qty: 30 TABLET | Refills: 5 | Status: SHIPPED | OUTPATIENT
Start: 2018-10-11 | End: 2019-05-07 | Stop reason: SDUPTHER

## 2018-10-11 RX ORDER — LISINOPRIL 10 MG/1
10 TABLET ORAL DAILY
Qty: 30 TABLET | Refills: 5 | Status: SHIPPED | OUTPATIENT
Start: 2018-10-11 | End: 2019-04-24 | Stop reason: SDUPTHER

## 2018-10-12 ENCOUNTER — HOSPITAL ENCOUNTER (OUTPATIENT)
Age: 53
Setting detail: SPECIMEN
Discharge: HOME | End: 2018-10-12
Payer: COMMERCIAL

## 2018-10-12 ENCOUNTER — OFFICE VISIT (OUTPATIENT)
Dept: INTERNAL MEDICINE CLINIC | Age: 53
End: 2018-10-12
Payer: COMMERCIAL

## 2018-10-12 VITALS
DIASTOLIC BLOOD PRESSURE: 64 MMHG | BODY MASS INDEX: 24.37 KG/M2 | WEIGHT: 196 LBS | OXYGEN SATURATION: 98 % | SYSTOLIC BLOOD PRESSURE: 110 MMHG | HEART RATE: 78 BPM | HEIGHT: 75 IN

## 2018-10-12 DIAGNOSIS — E11.9 TYPE 2 DIABETES MELLITUS WITHOUT COMPLICATION, WITHOUT LONG-TERM CURRENT USE OF INSULIN (HCC): ICD-10-CM

## 2018-10-12 DIAGNOSIS — E78.5 HYPERLIPIDEMIA, UNSPECIFIED HYPERLIPIDEMIA TYPE: ICD-10-CM

## 2018-10-12 DIAGNOSIS — R60.0 PEDAL EDEMA: ICD-10-CM

## 2018-10-12 DIAGNOSIS — I10 ESSENTIAL HYPERTENSION: Primary | ICD-10-CM

## 2018-10-12 DIAGNOSIS — F51.01 PRIMARY INSOMNIA: ICD-10-CM

## 2018-10-12 DIAGNOSIS — E11.8 TYPE 2 DIABETES MELLITUS WITH COMPLICATION, WITHOUT LONG-TERM CURRENT USE OF INSULIN (HCC): ICD-10-CM

## 2018-10-12 LAB
CHOLESTEROL/HDL RATIO: 2.5
CHOLESTEROL: 119 MG/DL
CREATININE URINE: 128.3 MG/DL (ref 39–259)
CREATININE URINE: 134.5 MG/DL (ref 39–259)
HDLC SERPL-MCNC: 47 MG/DL
LDL CHOLESTEROL: 50 MG/DL (ref 0–130)
MICROALBUMIN/CREAT 24H UR: <12 MG/L
MICROALBUMIN/CREAT UR-RTO: NORMAL MCG/MG CREAT
TOTAL PROTEIN, URINE: 11 MG/DL
TRIGL SERPL-MCNC: 109 MG/DL
URINE TOTAL PROTEIN CREATININE RATIO: 0.08 (ref 0–0.2)
VLDLC SERPL CALC-MCNC: NORMAL MG/DL (ref 1–30)

## 2018-10-12 PROCEDURE — 99214 OFFICE O/P EST MOD 30 MIN: CPT | Performed by: PHYSICIAN ASSISTANT

## 2018-10-12 RX ORDER — ATORVASTATIN CALCIUM 80 MG/1
80 TABLET, FILM COATED ORAL DAILY
Qty: 30 TABLET | Refills: 2 | Status: SHIPPED | OUTPATIENT
Start: 2018-10-12 | End: 2019-01-11 | Stop reason: SDUPTHER

## 2018-10-12 ASSESSMENT — ENCOUNTER SYMPTOMS
NAUSEA: 0
DIARRHEA: 0
BLOOD IN STOOL: 0
EYE DISCHARGE: 0
BACK PAIN: 1
SINUS PAIN: 0
CONSTIPATION: 0
SHORTNESS OF BREATH: 0
VOMITING: 0
SORE THROAT: 0
CHEST TIGHTNESS: 0
ABDOMINAL PAIN: 0
EYE ITCHING: 0
COLOR CHANGE: 0
COUGH: 0

## 2018-12-05 DIAGNOSIS — E11.8 TYPE 2 DIABETES MELLITUS WITH COMPLICATION (HCC): ICD-10-CM

## 2018-12-05 RX ORDER — ASPIRIN 81 MG
TABLET,CHEWABLE ORAL
Qty: 30 TABLET | Refills: 0 | Status: SHIPPED | OUTPATIENT
Start: 2018-12-05 | End: 2019-01-24 | Stop reason: SDUPTHER

## 2018-12-06 ENCOUNTER — HOSPITAL ENCOUNTER (OUTPATIENT)
Dept: DIABETES SERVICES | Age: 53
Setting detail: THERAPIES SERIES
Discharge: HOME | End: 2018-12-06
Payer: COMMERCIAL

## 2018-12-06 VITALS
HEART RATE: 71 BPM | BODY MASS INDEX: 25.19 KG/M2 | SYSTOLIC BLOOD PRESSURE: 110 MMHG | WEIGHT: 201.5 LBS | DIASTOLIC BLOOD PRESSURE: 72 MMHG

## 2018-12-06 PROCEDURE — G0108 DIAB MANAGE TRN  PER INDIV: HCPCS

## 2018-12-06 NOTE — PROGRESS NOTES
current facility-administered medications for this encounter.    :     Comments:  Allergies: Allergies   Allergen Reactions    Morphine Other (See Comments)     Lock jaw    Codeine Nausea And Vomiting     Diabetes 5  / Health Status    A1C blood level - at goal < 7%   Lab Results   Component Value Date    LABA1C 6.5 (H) 03/16/2018     Lab Results   Component Value Date    LABMICR CANNOT BE CALCULATED 10/12/2018    CREATININE 0.84 05/03/2018       Blood pressure ( 130/ 80)  Or less  BP Readings from Last 3 Encounters:   12/06/18 110/72   10/12/18 110/64   06/26/18 112/78        Cholesterol ( LDL under  100)   Lab Results   Component Value Date    LDLCHOLESTEROL 50 10/12/2018       4 . Smoking ? []Yes   [x]No    5. Taking an Asprin daily? [x]Yes   []No          Diabetes Self- Management Education Record    Participant Name: Althea Trejo  Referring Provider: Sushila Olson MD   Assessment/Evaluation Ratings:  1=Needs Instruction   4=Demonstrates Understanding/Competency  2=Needs Review   NC=Not Covered    3=Comprehends Key Points  N/A=Not Applicable  Topics/Learning Objectives Pre-session Assess Date:  5/21/18CB Instr. Date Reinforce Date Post- session Eval  12/6/18CB Comments   Diabetes disease process & Treatment process: Define diabetes & pre-diabetes; Identify own type of diabetes; role of the pancreas; signs/symptoms; diagnostic criteria; prevention & treatment options; contributing factors. 1 6/8/18RS  4 New Dx after going in for pre testing for hand surgery. Family Hx with mom, brother and sister . 5/21/18CB  Adopted but found biological parents age 21. Both mom and sister not as disciplined with DM. Pt decided due to past Hx with CSB to take good care of self for his family. 12/6/18CB   Incorporating nutritional management into lifestyle: Describe effect of type, amount & timing of food on blood glucose;  Describe basic meal planning techniques & current nutrition guideline   1up early 4-5a but does testing; safe lancet disposal.   1 6/8/18RS  4  Testing 2x/d BG range 102-212, was 119 this am. A1C 6.5% on 3/16/18    One touch - discussed how to tag bg and BG target ranges and lower cost BGs strips-6/8/18RS 7/24/18RS- understands targets - has about 3 X per week with fasting in 150 - 160  Range - even with CHO controlled eating plan    9/6/18rs- frustrated with cost of one touch strips - shown how to get to website and 1800 # to see if he could get a co pay card to lower monthly costs  12/6/18CB testing 2x/d and insurance covering cost, only $10 copay. BG 90's . Health  came to work and did A1C which was 5.5%, Saw PCP in Oct. Next appt 1/14/19     Prevention, detection & treatment of acute complications:  Identify symptoms of hyper & hypoglycemia, and prevention & treatment strategies. 1 6/8/18RS  3 Did notice some increased urination and dry mouth. Was consuming 1 gallon milk / day. 9/6/18rs- now only few glasses per week of milk - no more symptoms of high BG    Describe sick day guidelines & indications for  physician notification. Identify short term consequences of poor control. 1 9/6/18rs  3    Prevention, detection & treatment of chronic complications:  Define the natural course of diabetes & describe the relationship of blood glucose levels to long term complications of diabetes. Identify preventative measures & standards of care. 1 7/24/18RS  3 Having some teeth issues so seeing dentist now. Recently saw eye DrGray Also  5/21/18CB    C/o dry feet and thick nails - discussed foot care 7/24/18RS    Had  fasting labs 10/12 after visit end of Sept with PCP and lipids greatly improved from March, however Lipitor was increased at visit to 80 mg before lab work back. Suggeseted to call PCP to discuss lab results and see if he should continue the 80mg or go back to 40 mg Lipitor. Patient states he is scheduled to get heart Echogram but also gives Hx of mild heart attack 15 y ago.    12/6/18CB

## 2018-12-14 DIAGNOSIS — J45.20 MILD INTERMITTENT ASTHMA WITHOUT COMPLICATION: ICD-10-CM

## 2019-01-02 ENCOUNTER — HOSPITAL ENCOUNTER (OUTPATIENT)
Dept: NON INVASIVE DIAGNOSTICS | Age: 54
Discharge: HOME | End: 2019-01-02
Payer: COMMERCIAL

## 2019-01-03 ENCOUNTER — HOSPITAL ENCOUNTER (OUTPATIENT)
Dept: NON INVASIVE DIAGNOSTICS | Age: 54
Discharge: HOME | End: 2019-01-03
Payer: COMMERCIAL

## 2019-01-03 DIAGNOSIS — R60.0 PEDAL EDEMA: ICD-10-CM

## 2019-01-03 LAB
LV EF: 55 %
LVEF MODALITY: NORMAL

## 2019-01-03 PROCEDURE — 93306 TTE W/DOPPLER COMPLETE: CPT

## 2019-01-11 DIAGNOSIS — J45.20 MILD INTERMITTENT ASTHMA WITHOUT COMPLICATION: ICD-10-CM

## 2019-01-11 DIAGNOSIS — E78.5 HYPERLIPIDEMIA, UNSPECIFIED HYPERLIPIDEMIA TYPE: ICD-10-CM

## 2019-01-11 DIAGNOSIS — I10 ESSENTIAL HYPERTENSION: ICD-10-CM

## 2019-01-11 RX ORDER — ATORVASTATIN CALCIUM 80 MG/1
80 TABLET, FILM COATED ORAL DAILY
Qty: 30 TABLET | Refills: 3 | Status: SHIPPED | OUTPATIENT
Start: 2019-01-11 | End: 2019-02-06 | Stop reason: DRUGHIGH

## 2019-01-24 DIAGNOSIS — E11.8 TYPE 2 DIABETES MELLITUS WITH COMPLICATION (HCC): ICD-10-CM

## 2019-01-24 RX ORDER — ASPIRIN 81 MG/1
TABLET, CHEWABLE ORAL
Qty: 30 TABLET | Refills: 0 | Status: SHIPPED | OUTPATIENT
Start: 2019-01-24 | End: 2019-02-06 | Stop reason: SDUPTHER

## 2019-02-06 ENCOUNTER — OFFICE VISIT (OUTPATIENT)
Dept: INTERNAL MEDICINE CLINIC | Age: 54
End: 2019-02-06
Payer: COMMERCIAL

## 2019-02-06 VITALS
WEIGHT: 213.8 LBS | DIASTOLIC BLOOD PRESSURE: 84 MMHG | BODY MASS INDEX: 26.58 KG/M2 | SYSTOLIC BLOOD PRESSURE: 128 MMHG | OXYGEN SATURATION: 98 % | HEIGHT: 75 IN | HEART RATE: 94 BPM

## 2019-02-06 DIAGNOSIS — E78.5 HYPERLIPIDEMIA, UNSPECIFIED HYPERLIPIDEMIA TYPE: ICD-10-CM

## 2019-02-06 DIAGNOSIS — J06.9 UPPER RESPIRATORY TRACT INFECTION, UNSPECIFIED TYPE: ICD-10-CM

## 2019-02-06 DIAGNOSIS — I10 ESSENTIAL HYPERTENSION: ICD-10-CM

## 2019-02-06 DIAGNOSIS — S68.119D AMPUTATION OF FINGER, SUBSEQUENT ENCOUNTER: ICD-10-CM

## 2019-02-06 DIAGNOSIS — R09.81 NASAL CONGESTION: ICD-10-CM

## 2019-02-06 DIAGNOSIS — J30.2 SEASONAL ALLERGIES: ICD-10-CM

## 2019-02-06 DIAGNOSIS — E11.8 TYPE 2 DIABETES MELLITUS WITH COMPLICATION, WITHOUT LONG-TERM CURRENT USE OF INSULIN (HCC): Primary | ICD-10-CM

## 2019-02-06 DIAGNOSIS — Z11.4 ENCOUNTER FOR SCREENING FOR HIV: ICD-10-CM

## 2019-02-06 DIAGNOSIS — J45.20 MILD INTERMITTENT ASTHMA WITHOUT COMPLICATION: ICD-10-CM

## 2019-02-06 LAB — HBA1C MFR BLD: 6 %

## 2019-02-06 PROCEDURE — 83036 HEMOGLOBIN GLYCOSYLATED A1C: CPT | Performed by: NURSE PRACTITIONER

## 2019-02-06 PROCEDURE — 99214 OFFICE O/P EST MOD 30 MIN: CPT | Performed by: NURSE PRACTITIONER

## 2019-02-06 RX ORDER — ASPIRIN 81 MG/1
TABLET, CHEWABLE ORAL
Qty: 30 TABLET | Refills: 5 | Status: SHIPPED | OUTPATIENT
Start: 2019-02-06 | End: 2019-11-27 | Stop reason: SDUPTHER

## 2019-02-06 RX ORDER — GUAIFENESIN, PSEUDOEPHEDRINE HYDROCHLORIDE 600; 60 MG/1; MG/1
1 TABLET, EXTENDED RELEASE ORAL EVERY 12 HOURS
Qty: 28 TABLET | Refills: 0 | Status: SHIPPED | OUTPATIENT
Start: 2019-02-06 | End: 2019-02-20

## 2019-02-06 RX ORDER — ATORVASTATIN CALCIUM 40 MG/1
40 TABLET, FILM COATED ORAL DAILY
Qty: 30 TABLET | Refills: 5 | Status: SHIPPED | OUTPATIENT
Start: 2019-02-06 | End: 2019-05-07 | Stop reason: SDUPTHER

## 2019-02-06 RX ORDER — FLUTICASONE PROPIONATE 50 MCG
1 SPRAY, SUSPENSION (ML) NASAL
COMMUNITY
Start: 2019-01-09 | End: 2020-02-05

## 2019-02-06 RX ORDER — BUDESONIDE AND FORMOTEROL FUMARATE DIHYDRATE 80; 4.5 UG/1; UG/1
2 AEROSOL RESPIRATORY (INHALATION) 2 TIMES DAILY
COMMUNITY
End: 2020-02-12

## 2019-02-06 ASSESSMENT — PATIENT HEALTH QUESTIONNAIRE - PHQ9
SUM OF ALL RESPONSES TO PHQ QUESTIONS 1-9: 0
2. FEELING DOWN, DEPRESSED OR HOPELESS: 0
SUM OF ALL RESPONSES TO PHQ QUESTIONS 1-9: 0
1. LITTLE INTEREST OR PLEASURE IN DOING THINGS: 0
SUM OF ALL RESPONSES TO PHQ9 QUESTIONS 1 & 2: 0

## 2019-02-06 ASSESSMENT — ENCOUNTER SYMPTOMS
ABDOMINAL PAIN: 0
NAUSEA: 0
SHORTNESS OF BREATH: 1
SORE THROAT: 1
WHEEZING: 1
SINUS PRESSURE: 0
RHINORRHEA: 0
CONSTIPATION: 1
EYE PAIN: 0
COUGH: 1
SINUS PAIN: 0
DIARRHEA: 0

## 2019-02-21 ENCOUNTER — HOSPITAL ENCOUNTER (OUTPATIENT)
Dept: GENERAL RADIOLOGY | Age: 54
Discharge: HOME | End: 2019-02-23
Payer: COMMERCIAL

## 2019-02-21 ENCOUNTER — HOSPITAL ENCOUNTER (OUTPATIENT)
Age: 54
Discharge: HOME | End: 2019-02-23
Payer: COMMERCIAL

## 2019-02-21 ENCOUNTER — HOSPITAL ENCOUNTER (OUTPATIENT)
Dept: MRI IMAGING | Age: 54
Discharge: HOME | End: 2019-02-23
Payer: COMMERCIAL

## 2019-02-21 DIAGNOSIS — S46.211A BICEPS TENDON RUPTURE, PROXIMAL, RIGHT, INITIAL ENCOUNTER: ICD-10-CM

## 2019-02-21 DIAGNOSIS — T15.90XA FOREIGN BODY IN EYE, UNSPECIFIED LATERALITY, INITIAL ENCOUNTER: ICD-10-CM

## 2019-02-21 PROCEDURE — 70030 X-RAY EYE FOR FOREIGN BODY: CPT

## 2019-02-21 PROCEDURE — 73221 MRI JOINT UPR EXTREM W/O DYE: CPT

## 2019-03-27 LAB
ALBUMIN SERPL-MCNC: 4.8 G/DL
ALP BLD-CCNC: 85 U/L
ALT SERPL-CCNC: 25 U/L
ANION GAP SERPL CALCULATED.3IONS-SCNC: NORMAL MMOL/L
AST SERPL-CCNC: 18 U/L
BASOPHILS ABSOLUTE: 0 /ΜL
BASOPHILS RELATIVE PERCENT: 0 %
BILIRUB SERPL-MCNC: 0.5 MG/DL (ref 0.1–1.4)
BUN BLDV-MCNC: 19 MG/DL
CALCIUM SERPL-MCNC: 9.1 MG/DL
CHLORIDE BLD-SCNC: 104 MMOL/L
CO2: 24 MMOL/L
CREAT SERPL-MCNC: 1.01 MG/DL
EOSINOPHILS ABSOLUTE: 0.1 /ΜL
EOSINOPHILS RELATIVE PERCENT: 2 %
GFR CALCULATED: 85
GLUCOSE BLD-MCNC: 97 MG/DL
HCT VFR BLD CALC: 43.4 % (ref 41–53)
HEMOGLOBIN: 14.4 G/DL (ref 13.5–17.5)
LYMPHOCYTES ABSOLUTE: 1.9 /ΜL
LYMPHOCYTES RELATIVE PERCENT: 34 %
MCH RBC QN AUTO: 29.8 PG
MCHC RBC AUTO-ENTMCNC: 33.2 G/DL
MCV RBC AUTO: 90 FL
MONOCYTES ABSOLUTE: 0.5 /ΜL
MONOCYTES RELATIVE PERCENT: 9 %
NEUTROPHILS ABSOLUTE: 2.9 /ΜL
NEUTROPHILS RELATIVE PERCENT: 55 %
PLATELET # BLD: 277 K/ΜL
PMV BLD AUTO: NORMAL FL
POTASSIUM SERPL-SCNC: 4.5 MMOL/L
RBC # BLD: 4.84 10^6/ΜL
SODIUM BLD-SCNC: 142 MMOL/L
TOTAL PROTEIN: 7.2
WBC # BLD: 5.4 10^3/ML

## 2019-04-24 DIAGNOSIS — I10 ESSENTIAL HYPERTENSION: ICD-10-CM

## 2019-04-24 RX ORDER — LISINOPRIL 10 MG/1
10 TABLET ORAL DAILY
Qty: 30 TABLET | Refills: 5 | Status: SHIPPED | OUTPATIENT
Start: 2019-04-24 | End: 2019-05-07 | Stop reason: SDUPTHER

## 2019-05-07 ENCOUNTER — OFFICE VISIT (OUTPATIENT)
Dept: INTERNAL MEDICINE CLINIC | Age: 54
End: 2019-05-07
Payer: COMMERCIAL

## 2019-05-07 VITALS
WEIGHT: 215 LBS | HEIGHT: 75 IN | OXYGEN SATURATION: 97 % | HEART RATE: 67 BPM | SYSTOLIC BLOOD PRESSURE: 116 MMHG | BODY MASS INDEX: 26.73 KG/M2 | DIASTOLIC BLOOD PRESSURE: 82 MMHG

## 2019-05-07 DIAGNOSIS — J45.20 MILD INTERMITTENT ASTHMA WITHOUT COMPLICATION: ICD-10-CM

## 2019-05-07 DIAGNOSIS — I10 ESSENTIAL HYPERTENSION: ICD-10-CM

## 2019-05-07 DIAGNOSIS — E11.8 TYPE 2 DIABETES MELLITUS WITH COMPLICATION, WITHOUT LONG-TERM CURRENT USE OF INSULIN (HCC): ICD-10-CM

## 2019-05-07 DIAGNOSIS — Z12.11 COLON CANCER SCREENING: Primary | ICD-10-CM

## 2019-05-07 DIAGNOSIS — F51.01 PRIMARY INSOMNIA: ICD-10-CM

## 2019-05-07 DIAGNOSIS — E78.5 HYPERLIPIDEMIA, UNSPECIFIED HYPERLIPIDEMIA TYPE: ICD-10-CM

## 2019-05-07 DIAGNOSIS — Z12.11 COLON CANCER SCREENING: ICD-10-CM

## 2019-05-07 LAB — HBA1C MFR BLD: 5.9 %

## 2019-05-07 PROCEDURE — 83036 HEMOGLOBIN GLYCOSYLATED A1C: CPT | Performed by: INTERNAL MEDICINE

## 2019-05-07 PROCEDURE — 99214 OFFICE O/P EST MOD 30 MIN: CPT | Performed by: INTERNAL MEDICINE

## 2019-05-07 PROCEDURE — 82274 ASSAY TEST FOR BLOOD FECAL: CPT | Performed by: INTERNAL MEDICINE

## 2019-05-07 RX ORDER — ALBUTEROL SULFATE 90 UG/1
AEROSOL, METERED RESPIRATORY (INHALATION)
Qty: 8.5 G | Refills: 3 | Status: SHIPPED | OUTPATIENT
Start: 2019-05-07 | End: 2020-01-06

## 2019-05-07 RX ORDER — LISINOPRIL 10 MG/1
10 TABLET ORAL DAILY
Qty: 30 TABLET | Refills: 5 | Status: SHIPPED | OUTPATIENT
Start: 2019-05-07 | End: 2019-11-23 | Stop reason: SDUPTHER

## 2019-05-07 RX ORDER — ATORVASTATIN CALCIUM 40 MG/1
40 TABLET, FILM COATED ORAL DAILY
Qty: 30 TABLET | Refills: 5 | Status: SHIPPED | OUTPATIENT
Start: 2019-05-07 | End: 2019-11-23 | Stop reason: SDUPTHER

## 2019-05-07 RX ORDER — TRAZODONE HYDROCHLORIDE 150 MG/1
150 TABLET ORAL NIGHTLY
Qty: 30 TABLET | Refills: 2 | Status: SHIPPED | OUTPATIENT
Start: 2019-05-07 | End: 2019-08-27 | Stop reason: SDUPTHER

## 2019-05-07 ASSESSMENT — ENCOUNTER SYMPTOMS
CONSTIPATION: 0
APNEA: 0
DIARRHEA: 0
SHORTNESS OF BREATH: 0
COLOR CHANGE: 0
FACIAL SWELLING: 0
CHEST TIGHTNESS: 0
WHEEZING: 0
ABDOMINAL DISTENTION: 0
ABDOMINAL PAIN: 0
BACK PAIN: 0
COUGH: 0

## 2019-05-07 NOTE — PATIENT INSTRUCTIONS
Patient Education        Insomnia: Care Instructions  Your Care Instructions    Insomnia is the inability to sleep well. It is a common problem for most people at some time. Insomnia may make it hard for you to get to sleep, stay asleep, or sleep as long as you need to. This can make you tired and grouchy during the day. It can also make you forgetful, less effective at work, and unhappy. Insomnia can be caused by conditions such as depression or anxiety. Pain can also affect your ability to sleep. When these problems are solved, the insomnia usually clears up. But sometimes bad sleep habits can cause insomnia. If insomnia is affecting your work or your enjoyment of life, you can take steps to improve your sleep. Follow-up care is a key part of your treatment and safety. Be sure to make and go to all appointments, and call your doctor if you are having problems. It's also a good idea to know your test results and keep a list of the medicines you take. How can you care for yourself at home? What to avoid  · Do not have drinks with caffeine, such as coffee or black tea, for 8 hours before bed. · Do not smoke or use other types of tobacco near bedtime. Nicotine is a stimulant and can keep you awake. · Avoid drinking alcohol late in the evening, because it can cause you to wake in the middle of the night. · Do not eat a big meal close to bedtime. If you are hungry, eat a light snack. · Do not drink a lot of water close to bedtime, because the need to urinate may wake you up during the night. · Do not read or watch TV in bed. Use the bed only for sleeping and sexual activity. What to try  · Go to bed at the same time every night, and wake up at the same time every morning. Do not take naps during the day. · Keep your bedroom quiet, dark, and cool. · Sleep on a comfortable pillow and mattress. · If watching the clock makes you anxious, turn it facing away from you so you cannot see the time.   · If you worry when you lie down, start a worry book. Well before bedtime, write down your worries, and then set the book and your concerns aside. · Try meditation or other relaxation techniques before you go to bed. · If you cannot fall asleep, get up and go to another room until you feel sleepy. Do something relaxing. Repeat your bedtime routine before you go to bed again. · Make your house quiet and calm about an hour before bedtime. Turn down the lights, turn off the TV, log off the computer, and turn down the volume on music. This can help you relax after a busy day. When should you call for help? Watch closely for changes in your health, and be sure to contact your doctor if:    · Your efforts to improve your sleep do not work.     · Your insomnia gets worse.     · You have been feeling down, depressed, or hopeless or have lost interest in things that you usually enjoy. Where can you learn more? Go to https://wriplpeNottingham Technology.RacerTimes. org and sign in to your [employer] account. Enter P513 in the [employer] box to learn more about \"Insomnia: Care Instructions. \"     If you do not have an account, please click on the \"Sign Up Now\" link. Current as of: June 28, 2018  Content Version: 11.9  © 6019-9298 [employer]. Care instructions adapted under license by [facility] [address] If you have questions about a medical condition or this instruction, always ask your healthcare professional. [place] [address] any warranty or liability for your use of this information. Patient Education        Learning About Sleeping Well  What does sleeping well mean? Sleeping well means getting enough sleep. How much sleep is enough varies among people. The number of hours you sleep is not as important as how you feel when you wake up. If you do not feel refreshed, you probably need more sleep. Another sign of not getting enough sleep is feeling tired during the day.   The average total nightly sleep time is 7½ to 8 hours. Healthy adults may need a little more or a little less than this. Why is getting enough sleep important? Getting enough quality sleep is a basic part of good health. When your sleep suffers, your mood and your thoughts can suffer too. You may find yourself feeling more grumpy or stressed. Not getting enough sleep also can lead to serious problems, including injury, accidents, anxiety, and depression. What might cause poor sleeping? Many things can cause sleep problems, including:  · Stress. Stress can be caused by fear about a single event, such as giving a speech. Or you may have ongoing stress, such as worry about work or school. · Depression, anxiety, and other mental or emotional conditions. · Changes in your sleep habits or surroundings. This includes changes that happen where you sleep, such as noise, light, or sleeping in a different bed. It also includes changes in your sleep pattern, such as having jet lag or working a late shift. · Health problems, such as pain, breathing problems, and restless legs syndrome. · Lack of regular exercise. How can you help yourself? Here are some tips that may help you sleep more soundly and wake up feeling more refreshed. Your sleeping area  · Use your bedroom only for sleeping and sex. A bit of light reading may help you fall asleep. But if it doesn't, do your reading elsewhere in the house. Don't watch TV in bed. · Be sure your bed is big enough to stretch out comfortably, especially if you have a sleep partner. · Keep your bedroom quiet, dark, and cool. Use curtains, blinds, or a sleep mask to block out light. To block out noise, use earplugs, soothing music, or a \"white noise\" machine. Your evening and bedtime routine  · Create a relaxing bedtime routine. You might want to take a warm shower or bath, listen to soothing music, or drink a cup of noncaffeinated tea. · Go to bed at the same time every night.  And get up at the same time every morning, even if you feel tired. What to avoid  · Limit caffeine (coffee, tea, caffeinated sodas) during the day, and don't have any for at least 4 to 6 hours before bedtime. · Don't drink alcohol before bedtime. Alcohol can cause you to wake up more often during the night. · Don't smoke or use tobacco, especially in the evening. Nicotine can keep you awake. · Don't take naps during the day, especially close to bedtime. · Don't lie in bed awake for too long. If you can't fall asleep, or if you wake up in the middle of the night and can't get back to sleep within 15 minutes or so, get out of bed and go to another room until you feel sleepy. · Don't take medicine right before bed that may keep you awake or make you feel hyper or energized. Your doctor can tell you if your medicine may do this and if you can take it earlier in the day. If you can't sleep  · Imagine yourself in a peaceful, pleasant scene. Focus on the details and feelings of being in a place that is relaxing. · Get up and do a quiet or boring activity until you feel sleepy. · Don't drink any liquids after 6 p.m. if you wake up often because you have to go to the bathroom. Where can you learn more? Go to https://ConductivpeNexavis.Hyperpublic. org and sign in to your [employer] Video account. Enter I737 in the [facility] box to learn more about \"Learning About Sleeping Well. \"     If you do not have an account, please click on the \"Sign Up Now\" link. Current as of: September 11, 2018  Content Version: 11.9  © 3025-5680 [employer]. Care instructions adapted under license by [address] If you have questions about a medical condition or this instruction, always ask your healthcare professional. [address] any warranty or liability for your use of this information.

## 2019-05-16 ENCOUNTER — TELEPHONE (OUTPATIENT)
Dept: INTERNAL MEDICINE CLINIC | Age: 54
End: 2019-05-16

## 2019-05-21 LAB
CONTROL: PRESENT
HEMOCCULT STL QL: NEGATIVE

## 2019-05-22 DIAGNOSIS — E11.8 TYPE 2 DIABETES MELLITUS WITH COMPLICATION, WITHOUT LONG-TERM CURRENT USE OF INSULIN (HCC): Primary | ICD-10-CM

## 2019-05-27 DIAGNOSIS — F51.01 PRIMARY INSOMNIA: ICD-10-CM

## 2019-05-28 ENCOUNTER — TELEPHONE (OUTPATIENT)
Dept: INTERNAL MEDICINE CLINIC | Age: 54
End: 2019-05-28

## 2019-05-28 RX ORDER — TRAZODONE HYDROCHLORIDE 100 MG/1
TABLET ORAL
Qty: 30 TABLET | Refills: 0 | Status: SHIPPED | OUTPATIENT
Start: 2019-05-28 | End: 2020-02-05

## 2019-06-23 ENCOUNTER — HOSPITAL ENCOUNTER (EMERGENCY)
Age: 54
Discharge: HOME | End: 2019-06-23
Attending: EMERGENCY MEDICINE
Payer: COMMERCIAL

## 2019-06-23 VITALS
TEMPERATURE: 97.8 F | RESPIRATION RATE: 12 BRPM | WEIGHT: 210 LBS | SYSTOLIC BLOOD PRESSURE: 124 MMHG | DIASTOLIC BLOOD PRESSURE: 82 MMHG | HEART RATE: 88 BPM | BODY MASS INDEX: 26.11 KG/M2 | HEIGHT: 75 IN | OXYGEN SATURATION: 97 %

## 2019-06-23 DIAGNOSIS — S05.01XA ABRASION OF RIGHT CORNEA, INITIAL ENCOUNTER: Primary | ICD-10-CM

## 2019-06-23 PROCEDURE — 6370000000 HC RX 637 (ALT 250 FOR IP): Performed by: NURSE PRACTITIONER

## 2019-06-23 PROCEDURE — 99283 EMERGENCY DEPT VISIT LOW MDM: CPT

## 2019-06-23 RX ORDER — ERYTHROMYCIN 5 MG/G
OINTMENT OPHTHALMIC
Qty: 1 TUBE | Refills: 0 | Status: SHIPPED | OUTPATIENT
Start: 2019-06-23 | End: 2020-02-05

## 2019-06-23 RX ORDER — TETRACAINE HYDROCHLORIDE 5 MG/ML
1 SOLUTION OPHTHALMIC ONCE
Status: COMPLETED | OUTPATIENT
Start: 2019-06-23 | End: 2019-06-23

## 2019-06-23 RX ADMIN — FLUORESCEIN SODIUM 1 MG: 1 STRIP OPHTHALMIC at 15:24

## 2019-06-23 RX ADMIN — TETRACAINE HYDROCHLORIDE 1 DROP: 5 SOLUTION OPHTHALMIC at 15:23

## 2019-06-23 ASSESSMENT — ENCOUNTER SYMPTOMS
COUGH: 0
BLURRED VISION: 0
BLIND SPOTS: 0
EYE INFLAMMATION: 0
PERI-ORBITAL EDEMA: 0
VOMITING: 0
EYE WATERING: 1
EYE REDNESS: 1
EYE DISCHARGE: 0
TROUBLE SWALLOWING: 0
SHORTNESS OF BREATH: 0
NAUSEA: 0
PHOTOPHOBIA: 0

## 2019-06-23 ASSESSMENT — VISUAL ACUITY
OU: 20/40
OD: 20/50
OS: 20/40

## 2019-06-23 NOTE — ED PROVIDER NOTES
[address]  eMERGENCY dEPARTMENT eNCOUnter      Pt Name: Verona Horne  MRN: [id number]  [place] 1965  Date of evaluation: 6/23/19      CHIEF COMPLAINT       Chief Complaint   Patient presents with    Foreign Body in [facility]    Verona Horne is a 47 y.o. male   The history is provided by the patient. Eye Problem   Location:  Right eye  Quality:  Foreign body sensation  Severity:  Mild  Onset quality:  Gradual  Timing:  Constant  Progression:  Unchanged  Chronicity:  New  Context: foreign body    Context: not chemical exposure, not contact lens problem, not direct trauma and not smoke exposure    Context comment:  Was grinding and got soemthing in his right eye. was wearing protective eye wear  Foreign body:  Metal  Relieved by:  Nothing  Exacerbated by: blinking. Ineffective treatments:  Eye drops  Associated symptoms: redness and tearing    Associated symptoms: no blurred vision, no decreased vision, no discharge, no facial rash, no headaches, no inflammation, no nausea, no photophobia, no scotomas, no swelling, no tingling and no vomiting    Risk factors: no exposure to pinkeye and no recent URI        REVIEW OF SYSTEMS       Review of Systems   Constitutional: Negative for chills and fever. HENT: Negative for trouble swallowing. Eyes: Positive for redness. Negative for blurred vision, photophobia and discharge. FB right eye   Respiratory: Negative for cough and shortness of breath. Cardiovascular: Negative for chest pain and palpitations. Gastrointestinal: Negative for nausea and vomiting. Neurological: Negative for tingling and headaches.        PAST MEDICAL HISTORY     Past Medical History:   Diagnosis Date    Asthma     GERD (gastroesophageal reflux disease)     Heart attack ([facility] [address] 2003    Hyperlipidemia     Hypertension     Type 2 diabetes mellitus ([facility] [address]        SURGICAL HISTORY       Past Surgical History:   Procedure Laterality SpO2 97%   BMI 26.25 kg/m²      Physical Exam   Constitutional: He is oriented to person, place, and time. He appears well-developed and well-nourished. No distress. HENT:   Head: Normocephalic and atraumatic. Right Ear: External ear normal.   Left Ear: External ear normal.   Nose: Nose normal.   Eyes: Pupils are equal, round, and reactive to light. EOM and lids are normal. Lids are everted and swept, no foreign bodies found. Right eye exhibits no discharge. Left eye exhibits no discharge. Right conjunctiva is injected. No scleral icterus. Slit lamp exam:       The right eye shows corneal abrasion and fluorescein uptake. No FB. No myke sign. Neck: Normal range of motion. No tracheal deviation present. Pulmonary/Chest: Effort normal. No stridor. No respiratory distress. Musculoskeletal: Normal range of motion. He exhibits no edema. Neurological: He is alert and oriented to person, place, and time. Coordination normal.   Skin: Skin is warm and dry. He is not diaphoretic. Psychiatric: He has a normal mood and affect. His behavior is normal.       MEDICAL DECISION MAKING:   Patient presents for evaluation of FB in right eye. Foreign body noted. Has small corneal abrasion. Will treat with erythromycin cream.  Follow-up with ophthalmology in 1 day. Ok to discharge home. Follow up with PCP or clinic in one or 2 days for recheck. Return to ED if symptoms persist or worsen. DIAGNOSTICRESULTS     EKG: All EKG's are interpreted by the Emergency Department Physician who either signs or Co-signs this chartin the absence of a cardiologist.    none    RADIOLOGY:All plain film, CT, MRI, and formal ultrasound images (except ED bedside ultrasound) are read by the radiologist and the images and interpretations are directly viewed by the emergency physician. No results found. LABS: All lab results were reviewed by myself, and all abnormals are listed below.   Labs Reviewed - No data to display      EMERGENCY DEPARTMENT COURSE:   Vitals:    Vitals:    19 1515 19 1516   BP:  124/82   Pulse:  88   Resp:  12   Temp:  97.8 °F (36.6 °C)   TempSrc:  Oral   SpO2:  97%   Weight: 210 lb (95.3 kg)    Height: 6' 3\" (1.905 m)        Reviewed. The patient was given the followingmedications while in the emergency department:  Orders Placed This Encounter   Medications    fluorescein ophthalmic strip 1 mg    tetracaine (TETRAVISC) 0.5 % ophthalmic solution 1 drop    erythromycin (ROMYCIN) 5 MG/GM ophthalmic ointment     Si.5 inch ribbon to right eye 4 times daily     Dispense:  1 Tube     Refill:  0         CONSULTS:  None    PROCEDURES:  Procedures    Woods lamp exam: 1 drop of tetracaine instilled in right eye. Stained with fluorescein. Evaluated with Woods lamp, there is a small corneal abrasion at 7 o'clock position. No foreign body noted. FINAL IMPRESSION      1. Abrasion of right cornea, initial encounter          DISPOSITION/PLAN   DISPOSITION Decision To Discharge 2019 03:41:07 PM      PATIENT REFERRED TO:  Rajin Francis MD  [address] [place] [address]. Geo.   [facility] [address]  [phone]    Schedule an appointment as soon as possible for a visit in 1 day  Follow up visit    [address] [facility] ED  [place] [address] [phone]    If symptoms worsen      DISCHARGE MEDICATIONS:  New Prescriptions    ERYTHROMYCIN (ROMYCIN) 5 MG/GM OPHTHALMIC OINTMENT    0.5 inch ribbon to right eye 4 times daily       (Please note that portions of this note were completed with a voice recognition program.  Efforts were made to edit the dictations but occasionally wordsare mis-transcribed.)    Inez Bolye, [place] , APRN - CNP  [phone]

## 2019-06-24 ENCOUNTER — TELEPHONE (OUTPATIENT)
Dept: INTERNAL MEDICINE CLINIC | Age: 54
End: 2019-06-24

## 2019-08-27 DIAGNOSIS — F51.01 PRIMARY INSOMNIA: ICD-10-CM

## 2019-08-28 RX ORDER — TRAZODONE HYDROCHLORIDE 150 MG/1
TABLET ORAL
Qty: 30 TABLET | Refills: 0 | Status: SHIPPED | OUTPATIENT
Start: 2019-08-28 | End: 2019-09-25 | Stop reason: SDUPTHER

## 2019-09-25 DIAGNOSIS — F51.01 PRIMARY INSOMNIA: ICD-10-CM

## 2019-09-25 RX ORDER — TRAZODONE HYDROCHLORIDE 150 MG/1
TABLET ORAL
Qty: 30 TABLET | Refills: 0 | Status: SHIPPED | OUTPATIENT
Start: 2019-09-25 | End: 2019-10-24 | Stop reason: SDUPTHER

## 2019-10-24 DIAGNOSIS — F51.01 PRIMARY INSOMNIA: ICD-10-CM

## 2019-10-24 RX ORDER — TRAZODONE HYDROCHLORIDE 150 MG/1
TABLET ORAL
Qty: 30 TABLET | Refills: 0 | Status: SHIPPED | OUTPATIENT
Start: 2019-10-24 | End: 2019-11-23 | Stop reason: SDUPTHER

## 2019-11-06 RX ORDER — DILTIAZEM HYDROCHLORIDE 60 MG/1
TABLET, FILM COATED ORAL
Qty: 10.2 G | Refills: 2 | Status: SHIPPED | OUTPATIENT
Start: 2019-11-06 | End: 2020-02-05 | Stop reason: SDUPTHER

## 2019-11-23 DIAGNOSIS — E78.5 HYPERLIPIDEMIA, UNSPECIFIED HYPERLIPIDEMIA TYPE: ICD-10-CM

## 2019-11-23 DIAGNOSIS — I10 ESSENTIAL HYPERTENSION: ICD-10-CM

## 2019-11-23 DIAGNOSIS — F51.01 PRIMARY INSOMNIA: ICD-10-CM

## 2019-11-25 RX ORDER — TRAZODONE HYDROCHLORIDE 150 MG/1
TABLET ORAL
Qty: 30 TABLET | Refills: 2 | Status: SHIPPED | OUTPATIENT
Start: 2019-11-25 | End: 2020-02-05

## 2019-11-25 RX ORDER — LISINOPRIL 10 MG/1
10 TABLET ORAL DAILY
Qty: 30 TABLET | Refills: 2 | Status: SHIPPED | OUTPATIENT
Start: 2019-11-25 | End: 2020-04-27

## 2019-11-25 RX ORDER — ATORVASTATIN CALCIUM 40 MG/1
40 TABLET, FILM COATED ORAL DAILY
Qty: 30 TABLET | Refills: 2 | Status: SHIPPED | OUTPATIENT
Start: 2019-11-25 | End: 2020-03-16

## 2019-11-27 DIAGNOSIS — E11.8 TYPE 2 DIABETES MELLITUS WITH COMPLICATION, WITHOUT LONG-TERM CURRENT USE OF INSULIN (HCC): ICD-10-CM

## 2019-11-27 RX ORDER — ASPIRIN 81 MG/1
TABLET, CHEWABLE ORAL
Qty: 30 TABLET | Refills: 2 | Status: SHIPPED | OUTPATIENT
Start: 2019-11-27 | End: 2020-03-19

## 2020-01-06 RX ORDER — ALBUTEROL SULFATE 90 UG/1
AEROSOL, METERED RESPIRATORY (INHALATION)
Qty: 8.5 G | Refills: 0 | Status: SHIPPED | OUTPATIENT
Start: 2020-01-06 | End: 2020-03-02

## 2020-02-05 ENCOUNTER — OFFICE VISIT (OUTPATIENT)
Dept: INTERNAL MEDICINE CLINIC | Age: 55
End: 2020-02-05
Payer: COMMERCIAL

## 2020-02-05 VITALS
HEIGHT: 75 IN | HEART RATE: 81 BPM | SYSTOLIC BLOOD PRESSURE: 118 MMHG | DIASTOLIC BLOOD PRESSURE: 64 MMHG | BODY MASS INDEX: 27.48 KG/M2 | OXYGEN SATURATION: 97 % | WEIGHT: 221 LBS

## 2020-02-05 PROCEDURE — 99214 OFFICE O/P EST MOD 30 MIN: CPT | Performed by: INTERNAL MEDICINE

## 2020-02-05 ASSESSMENT — PATIENT HEALTH QUESTIONNAIRE - PHQ9
SUM OF ALL RESPONSES TO PHQ QUESTIONS 1-9: 0
SUM OF ALL RESPONSES TO PHQ9 QUESTIONS 1 & 2: 0
2. FEELING DOWN, DEPRESSED OR HOPELESS: 0
1. LITTLE INTEREST OR PLEASURE IN DOING THINGS: 0
SUM OF ALL RESPONSES TO PHQ QUESTIONS 1-9: 0

## 2020-02-05 ASSESSMENT — ENCOUNTER SYMPTOMS
DIARRHEA: 0
APNEA: 0
CONSTIPATION: 0
BACK PAIN: 0
COLOR CHANGE: 0
CHEST TIGHTNESS: 0
ABDOMINAL PAIN: 0
COUGH: 0
ABDOMINAL DISTENTION: 0
FACIAL SWELLING: 0
WHEEZING: 1
SHORTNESS OF BREATH: 1

## 2020-02-05 NOTE — PROGRESS NOTES
and health maintenance. · Discussed use, benefit, and side effects of prescribed medications. Barriers to medication compliance addressed. All patient questions answered. Pt voiced understanding. MD [facility]  2/5/2020, 9:59 AM    Please note that this chart was generated using voice recognition Dragon dictation software. Although every effort was made to ensure the accuracy of this automated transcription, some errors in transcription may have occurred.

## 2020-02-06 ENCOUNTER — HOSPITAL ENCOUNTER (OUTPATIENT)
Age: 55
Setting detail: SPECIMEN
Discharge: HOME | End: 2020-02-06
Payer: COMMERCIAL

## 2020-02-06 LAB
ALBUMIN SERPL-MCNC: 4.1 G/DL (ref 3.5–5.2)
ALBUMIN/GLOBULIN RATIO: 1.5 (ref 1–2.5)
ALP BLD-CCNC: 93 U/L (ref 40–129)
ALT SERPL-CCNC: 34 U/L (ref 5–41)
ANION GAP SERPL CALCULATED.3IONS-SCNC: 15 MMOL/L (ref 9–17)
AST SERPL-CCNC: 34 U/L
BILIRUB SERPL-MCNC: 0.62 MG/DL (ref 0.3–1.2)
BUN BLDV-MCNC: 15 MG/DL (ref 6–20)
BUN/CREAT BLD: ABNORMAL (ref 9–20)
CALCIUM SERPL-MCNC: 9.7 MG/DL (ref 8.6–10.4)
CHLORIDE BLD-SCNC: 103 MMOL/L (ref 98–107)
CHOLESTEROL/HDL RATIO: 3.9
CHOLESTEROL: 161 MG/DL
CO2: 23 MMOL/L (ref 20–31)
CREAT SERPL-MCNC: 0.9 MG/DL (ref 0.7–1.2)
ESTIMATED AVERAGE GLUCOSE: 123 MG/DL
GFR AFRICAN AMERICAN: >60 ML/MIN
GFR NON-AFRICAN AMERICAN: >60 ML/MIN
GFR SERPL CREATININE-BSD FRML MDRD: ABNORMAL ML/MIN/{1.73_M2}
GFR SERPL CREATININE-BSD FRML MDRD: ABNORMAL ML/MIN/{1.73_M2}
GLUCOSE BLD-MCNC: 110 MG/DL (ref 70–99)
HBA1C MFR BLD: 5.9 % (ref 4–6)
HCT VFR BLD CALC: 43.9 % (ref 40.7–50.3)
HDLC SERPL-MCNC: 41 MG/DL
HEMOGLOBIN: 14.1 G/DL (ref 13–17)
LDL CHOLESTEROL: 45 MG/DL (ref 0–130)
MCH RBC QN AUTO: 30.3 PG (ref 25.2–33.5)
MCHC RBC AUTO-ENTMCNC: 32.1 G/DL (ref 28.4–34.8)
MCV RBC AUTO: 94.2 FL (ref 82.6–102.9)
NRBC AUTOMATED: 0 PER 100 WBC
PDW BLD-RTO: 13.2 % (ref 11.8–14.4)
PLATELET # BLD: 245 K/UL (ref 138–453)
PMV BLD AUTO: 11.5 FL (ref 8.1–13.5)
POTASSIUM SERPL-SCNC: 4.5 MMOL/L (ref 3.7–5.3)
PROSTATE SPECIFIC ANTIGEN: 0.33 UG/L
RBC # BLD: 4.66 M/UL (ref 4.21–5.77)
SODIUM BLD-SCNC: 141 MMOL/L (ref 135–144)
TOTAL PROTEIN: 6.8 G/DL (ref 6.4–8.3)
TRIGL SERPL-MCNC: 376 MG/DL
VLDLC SERPL CALC-MCNC: ABNORMAL MG/DL (ref 1–30)
WBC # BLD: 5.3 K/UL (ref 3.5–11.3)

## 2020-02-07 ENCOUNTER — HOSPITAL ENCOUNTER (OUTPATIENT)
Age: 55
Setting detail: SPECIMEN
Discharge: HOME | End: 2020-02-07
Payer: COMMERCIAL

## 2020-02-07 LAB
CREATININE URINE: 161.6 MG/DL (ref 39–259)
MICROALBUMIN/CREAT 24H UR: <12 MG/L
MICROALBUMIN/CREAT UR-RTO: NORMAL MCG/MG CREAT

## 2020-02-12 RX ORDER — DILTIAZEM HYDROCHLORIDE 60 MG/1
TABLET, FILM COATED ORAL
Qty: 10.2 G | Refills: 2 | Status: SHIPPED | OUTPATIENT
Start: 2020-02-12 | End: 2020-11-09 | Stop reason: SDUPTHER

## 2020-02-17 ENCOUNTER — HOSPITAL ENCOUNTER (EMERGENCY)
Age: 55
Discharge: HOME | End: 2020-02-17
Attending: EMERGENCY MEDICINE
Payer: COMMERCIAL

## 2020-02-17 ENCOUNTER — APPOINTMENT (OUTPATIENT)
Dept: CT IMAGING | Age: 55
End: 2020-02-17
Payer: COMMERCIAL

## 2020-02-17 VITALS
TEMPERATURE: 97.9 F | HEIGHT: 75 IN | HEART RATE: 77 BPM | SYSTOLIC BLOOD PRESSURE: 136 MMHG | WEIGHT: 213 LBS | RESPIRATION RATE: 16 BRPM | BODY MASS INDEX: 26.49 KG/M2 | OXYGEN SATURATION: 97 % | DIASTOLIC BLOOD PRESSURE: 97 MMHG

## 2020-02-17 LAB
-: NORMAL
-: NORMAL
ABSOLUTE EOS #: 0.4 K/UL (ref 0–0.4)
ABSOLUTE IMMATURE GRANULOCYTE: ABNORMAL K/UL (ref 0–0.3)
ABSOLUTE LYMPH #: 1.2 K/UL (ref 1–4.8)
ABSOLUTE MONO #: 0.5 K/UL (ref 0.1–1.3)
AMORPHOUS: NORMAL
BACTERIA: NORMAL
BASOPHILS # BLD: 1 % (ref 0–2)
BASOPHILS ABSOLUTE: 0 K/UL (ref 0–0.2)
BILIRUBIN URINE: NEGATIVE
CASTS UA: NORMAL /LPF
COLOR: YELLOW
COMMENT UA: ABNORMAL
CRYSTALS, UA: NORMAL /HPF
DIFFERENTIAL TYPE: ABNORMAL
EOSINOPHILS RELATIVE PERCENT: 7 % (ref 0–4)
EPITHELIAL CELLS UA: NORMAL /HPF
GLUCOSE URINE: NEGATIVE
HCT VFR BLD CALC: 44.2 % (ref 41–53)
HEMOGLOBIN: 14.6 G/DL (ref 13.5–17.5)
IMMATURE GRANULOCYTES: ABNORMAL %
KETONES, URINE: NEGATIVE
LEUKOCYTE ESTERASE, URINE: NEGATIVE
LYMPHOCYTES # BLD: 21 % (ref 24–44)
MCH RBC QN AUTO: 30.2 PG (ref 26–34)
MCHC RBC AUTO-ENTMCNC: 33 G/DL (ref 31–37)
MCV RBC AUTO: 91.5 FL (ref 80–100)
MONOCYTES # BLD: 10 % (ref 1–7)
MUCUS: NORMAL
NITRITE, URINE: NEGATIVE
NRBC AUTOMATED: ABNORMAL PER 100 WBC
OTHER OBSERVATIONS UA: NORMAL
PDW BLD-RTO: 14.2 % (ref 11.5–14.9)
PH UA: 5 (ref 5–8)
PLATELET # BLD: 207 K/UL (ref 150–450)
PLATELET ESTIMATE: ABNORMAL
PMV BLD AUTO: 9.5 FL (ref 6–12)
PROTEIN UA: NEGATIVE
RBC # BLD: 4.83 M/UL (ref 4.5–5.9)
RBC # BLD: ABNORMAL 10*6/UL
RBC UA: NORMAL /HPF
REASON FOR REJECTION: NORMAL
RENAL EPITHELIAL, UA: NORMAL /HPF
SEG NEUTROPHILS: 61 % (ref 36–66)
SEGMENTED NEUTROPHILS ABSOLUTE COUNT: 3.5 K/UL (ref 1.3–9.1)
SPECIFIC GRAVITY UA: 1.01 (ref 1–1.03)
TRICHOMONAS: NORMAL
TURBIDITY: CLEAR
URINE HGB: ABNORMAL
UROBILINOGEN, URINE: NORMAL
WBC # BLD: 5.7 K/UL (ref 3.5–11)
WBC # BLD: ABNORMAL 10*3/UL
WBC UA: NORMAL /HPF
YEAST: NORMAL
ZZ NTE CLEAN UP: ORDERED TEST: NORMAL
ZZ NTE WITH NAME CLEAN UP: SPECIMEN SOURCE: NORMAL

## 2020-02-17 PROCEDURE — 85025 COMPLETE CBC W/AUTO DIFF WBC: CPT

## 2020-02-17 PROCEDURE — 99284 EMERGENCY DEPT VISIT MOD MDM: CPT

## 2020-02-17 PROCEDURE — 81001 URINALYSIS AUTO W/SCOPE: CPT

## 2020-02-17 PROCEDURE — 74176 CT ABD & PELVIS W/O CONTRAST: CPT

## 2020-02-17 PROCEDURE — 36415 COLL VENOUS BLD VENIPUNCTURE: CPT

## 2020-02-17 ASSESSMENT — ENCOUNTER SYMPTOMS
EYE PAIN: 0
SINUS PRESSURE: 0
COLOR CHANGE: 0
COUGH: 0
TROUBLE SWALLOWING: 0
FACIAL SWELLING: 0
CONSTIPATION: 0
BACK PAIN: 0
CHEST TIGHTNESS: 0
EYE DISCHARGE: 0
EYE REDNESS: 0
DIARRHEA: 0
ABDOMINAL PAIN: 0
BLOOD IN STOOL: 0
NAUSEA: 0
WHEEZING: 0
SHORTNESS OF BREATH: 0
VOMITING: 0
SORE THROAT: 0
RHINORRHEA: 0

## 2020-02-17 NOTE — ED PROVIDER NOTES
and atraumatic. Eyes:      General: No scleral icterus. Right eye: No discharge. Left eye: No discharge. Conjunctiva/sclera: Conjunctivae normal.      Pupils: Pupils are equal, round, and reactive to light. Cardiovascular:      Rate and Rhythm: Normal rate and regular rhythm. Heart sounds: Normal heart sounds. No murmur. No friction rub. No gallop. Pulmonary:      Effort: Pulmonary effort is normal. No respiratory distress. Breath sounds: Normal breath sounds. No wheezing or rales. Chest:      Chest wall: No tenderness. Abdominal:      General: Bowel sounds are normal. There is no distension. Palpations: Abdomen is soft. There is no mass. Tenderness: There is no abdominal tenderness. There is no right CVA tenderness, left CVA tenderness, guarding or rebound. Musculoskeletal: Normal range of motion. General: No tenderness. Skin:     General: Skin is warm and dry. Coloration: Skin is not pale. Findings: No erythema or rash. Neurological:      Mental Status: He is alert and oriented to person, place, and time. Cranial Nerves: No cranial nerve deficit. Sensory: No sensory deficit. Motor: No abnormal muscle tone. Coordination: Coordination normal.      Deep Tendon Reflexes: Reflexes normal.   Psychiatric:         Behavior: Behavior normal.         Thought Content: Thought content normal.         Judgment: Judgment normal.         DIAGNOSTIC RESULTS     RADIOLOGY:All plain film, CT,MRI, and formal ultrasound images (except ED bedside ultrasound) are read by the radiologist and the interpretations are directly viewed by the emergency physician.    Ct Abdomen Pelvis Wo Contrast    Result Date: 2/17/2020  EXAMINATION: CT OF THE ABDOMEN AND PELVIS WITHOUT CONTRAST 2/17/2020 9:41 am TECHNIQUE: CT of the abdomen and pelvis was performed without the administration of intravenous contrast. Multiplanar reformatted images are provided for review. Dose modulation, iterative reconstruction, and/or weight based adjustment of the mA/kV was utilized to reduce the radiation dose to as low as reasonably achievable. COMPARISON: None. HISTORY: ORDERING SYSTEM PROVIDED HISTORY: Abdominal Pain TECHNOLOGIST PROVIDED HISTORY: Abdominal Pain Reason for Exam: HEMATURI, HX OF STONES Acuity: Unknown Type of Exam: Unknown Relevant Medical/Surgical History: APPY FINDINGS: LOWER CHEST: No focal abnormality. KIDNEYS AND URINARY TRACT: No renal calculi are identified. There is no evidence for hydronephrosis. The ureters are of normal course and caliber. ORGANS: Lack of intravenous contrast limits evaluation of the solid organs and bowel. The liver, gallbladder, pancreas, spleen, and adrenals reveal no acute abnormality. Calcified granulomas in the liver and spleen. GI/BOWEL: No bowel dilatation or wall thickening. PELVIS: No acute findings. PERITONEUM/RETROPERITONEUM: No lymphadenopathy is noted. No free air or free fluid. The aorta is normal in caliber. BONES/SOFT TISSUES: No acute osseous abnormality is identified. Degenerative type superior endplate deformity of D41. No acute abnormality identified. No renal calculi. LABS: All lab results were reviewed by myself, and all abnormals are listed below. Labs Reviewed   CBC WITH AUTO DIFFERENTIAL - Abnormal; Notable for the following components:       Result Value    Lymphocytes 21 (*)     Monocytes 10 (*)     Eosinophils % 7 (*)     All other components within normal limits   URINE RT REFLEX TO CULTURE - Abnormal; Notable for the following components:    Urine Hgb LARGE (*)     All other components within normal limits   MICROSCOPIC URINALYSIS   SPECIMEN REJECTION         MEDICAL DECISION MAKING:     This point time my biggest concerns are either a urinary tract infection kidney stone or more importantly some type of urinary tract mass.   We will do some blood work get a urine to look for infection and do a CT scan. EMERGENCY DEPARTMENT COURSE:   Vitals:    Vitals:    02/17/20 0848   BP: (!) 136/97   Pulse: 77   Resp: 16   Temp: 97.9 °F (36.6 °C)   TempSrc: Oral   SpO2: 97%   Weight: 213 lb (96.6 kg)   Height: 6' 3\" (1.905 m)       The patient was given the following medications while in the emergency department:  No orders of the defined types were placed in this encounter. -------------------------  10:18 AM  She was updated on results and plan to follow-up with urology. CONSULTS:  None    PROCEDURES:  None    FINAL IMPRESSION      1.  Hematuria, unspecified type          DISPOSITION/PLAN   DISPOSITION Decision To Discharge 02/17/2020 10:17:17 AM      PATIENT REFERREDTO:  Sonia Kenia, [address] [facility] [address]  [phone]    In 1 week      Danilo Vyas MD  [facility] [address]   [address] [address]  [phone]    In 1 week      [facility] ED  [facility]  [address] [facility]  [phone]    If symptoms worsen      DISCHARGEMEDICATIONS:  Current Discharge Medication List          (Please note that portions of this note were completed with a voice recognition program.  Efforts were made to edit thedictations but occasionally words are mis-transcribed.)    Khushboo Yee MD  Attending Emergency Physician                        Khushboo Yee MD  02/17/20 0991

## 2020-03-02 RX ORDER — ALBUTEROL SULFATE 90 UG/1
AEROSOL, METERED RESPIRATORY (INHALATION)
Qty: 8.5 G | Refills: 0 | Status: SHIPPED | OUTPATIENT
Start: 2020-03-02 | End: 2020-03-19

## 2020-03-16 RX ORDER — ATORVASTATIN CALCIUM 40 MG/1
40 TABLET, FILM COATED ORAL DAILY
Qty: 30 TABLET | Refills: 2 | Status: SHIPPED | OUTPATIENT
Start: 2020-03-16 | End: 2020-06-11

## 2020-03-19 RX ORDER — ASPIRIN 81 MG/1
TABLET, CHEWABLE ORAL
Qty: 30 TABLET | Refills: 2 | Status: SHIPPED | OUTPATIENT
Start: 2020-03-19 | End: 2020-08-11 | Stop reason: SDUPTHER

## 2020-03-19 RX ORDER — ALBUTEROL SULFATE 90 UG/1
AEROSOL, METERED RESPIRATORY (INHALATION)
Qty: 8.5 G | Refills: 0 | Status: SHIPPED | OUTPATIENT
Start: 2020-03-19 | End: 2020-05-18

## 2020-04-27 RX ORDER — LISINOPRIL 10 MG/1
10 TABLET ORAL DAILY
Qty: 30 TABLET | Refills: 2 | Status: SHIPPED | OUTPATIENT
Start: 2020-04-27 | End: 2020-07-14

## 2020-05-08 ENCOUNTER — HOSPITAL ENCOUNTER (OUTPATIENT)
Age: 55
Setting detail: SPECIMEN
Discharge: HOME | End: 2020-05-08
Payer: COMMERCIAL

## 2020-05-08 ENCOUNTER — OFFICE VISIT (OUTPATIENT)
Dept: INTERNAL MEDICINE CLINIC | Age: 55
End: 2020-05-08
Payer: COMMERCIAL

## 2020-05-08 VITALS
SYSTOLIC BLOOD PRESSURE: 114 MMHG | BODY MASS INDEX: 27.35 KG/M2 | DIASTOLIC BLOOD PRESSURE: 76 MMHG | HEIGHT: 75 IN | OXYGEN SATURATION: 96 % | WEIGHT: 220 LBS | HEART RATE: 94 BPM

## 2020-05-08 LAB
-: NORMAL
ABSOLUTE EOS #: 0.42 K/UL (ref 0–0.44)
ABSOLUTE IMMATURE GRANULOCYTE: <0.03 K/UL (ref 0–0.3)
ABSOLUTE LYMPH #: 1.57 K/UL (ref 1.1–3.7)
ABSOLUTE MONO #: 0.61 K/UL (ref 0.1–1.2)
AMORPHOUS: NORMAL
ANION GAP SERPL CALCULATED.3IONS-SCNC: 14 MMOL/L (ref 9–17)
BACTERIA: NORMAL
BASOPHILS # BLD: 1 % (ref 0–2)
BASOPHILS ABSOLUTE: 0.05 K/UL (ref 0–0.2)
BILIRUBIN URINE: NEGATIVE
BUN BLDV-MCNC: 13 MG/DL (ref 6–20)
BUN/CREAT BLD: ABNORMAL (ref 9–20)
CALCIUM SERPL-MCNC: 9.4 MG/DL (ref 8.6–10.4)
CASTS UA: NORMAL /LPF (ref 0–8)
CHLORIDE BLD-SCNC: 99 MMOL/L (ref 98–107)
CO2: 24 MMOL/L (ref 20–31)
COLOR: ABNORMAL
COMMENT UA: ABNORMAL
CREAT SERPL-MCNC: 0.83 MG/DL (ref 0.7–1.2)
CRYSTALS, UA: NORMAL /HPF
DIFFERENTIAL TYPE: ABNORMAL
EOSINOPHILS RELATIVE PERCENT: 7 % (ref 1–4)
EPITHELIAL CELLS UA: NORMAL /HPF (ref 0–5)
ESTIMATED AVERAGE GLUCOSE: 131 MG/DL
GFR AFRICAN AMERICAN: >60 ML/MIN
GFR NON-AFRICAN AMERICAN: >60 ML/MIN
GFR SERPL CREATININE-BSD FRML MDRD: ABNORMAL ML/MIN/{1.73_M2}
GFR SERPL CREATININE-BSD FRML MDRD: ABNORMAL ML/MIN/{1.73_M2}
GLUCOSE BLD-MCNC: 102 MG/DL (ref 70–99)
GLUCOSE URINE: NEGATIVE
HBA1C MFR BLD: 6.2 % (ref 4–6)
HCT VFR BLD CALC: 47.5 % (ref 40.7–50.3)
HEMOGLOBIN: 15.1 G/DL (ref 13–17)
IMMATURE GRANULOCYTES: 0 %
KETONES, URINE: NEGATIVE
LEUKOCYTE ESTERASE, URINE: ABNORMAL
LYMPHOCYTES # BLD: 26 % (ref 24–43)
MCH RBC QN AUTO: 29.9 PG (ref 25.2–33.5)
MCHC RBC AUTO-ENTMCNC: 31.8 G/DL (ref 28.4–34.8)
MCV RBC AUTO: 94.1 FL (ref 82.6–102.9)
MONOCYTES # BLD: 10 % (ref 3–12)
MUCUS: NORMAL
NITRITE, URINE: NEGATIVE
NRBC AUTOMATED: 0 PER 100 WBC
OTHER OBSERVATIONS UA: NORMAL
PDW BLD-RTO: 13.2 % (ref 11.8–14.4)
PH UA: 5 (ref 5–8)
PLATELET # BLD: 260 K/UL (ref 138–453)
PLATELET ESTIMATE: ABNORMAL
PMV BLD AUTO: 11.6 FL (ref 8.1–13.5)
POTASSIUM SERPL-SCNC: 4.4 MMOL/L (ref 3.7–5.3)
PROTEIN UA: ABNORMAL
RBC # BLD: 5.05 M/UL (ref 4.21–5.77)
RBC # BLD: ABNORMAL 10*6/UL
RBC UA: NORMAL /HPF (ref 0–4)
RENAL EPITHELIAL, UA: NORMAL /HPF
SEG NEUTROPHILS: 56 % (ref 36–65)
SEGMENTED NEUTROPHILS ABSOLUTE COUNT: 3.35 K/UL (ref 1.5–8.1)
SODIUM BLD-SCNC: 137 MMOL/L (ref 135–144)
SPECIFIC GRAVITY UA: 1.01 (ref 1–1.03)
TRICHOMONAS: NORMAL
TURBIDITY: ABNORMAL
URINE HGB: ABNORMAL
UROBILINOGEN, URINE: NORMAL
WBC # BLD: 6 K/UL (ref 3.5–11.3)
WBC # BLD: ABNORMAL 10*3/UL
WBC UA: NORMAL /HPF (ref 0–5)
YEAST: NORMAL

## 2020-05-08 PROCEDURE — 99214 OFFICE O/P EST MOD 30 MIN: CPT | Performed by: PHYSICIAN ASSISTANT

## 2020-05-08 ASSESSMENT — ENCOUNTER SYMPTOMS
ABDOMINAL PAIN: 0
COUGH: 0
VOMITING: 0
RHINORRHEA: 0
EYE ITCHING: 0
CHEST TIGHTNESS: 0
EYE DISCHARGE: 0
DIARRHEA: 0
WHEEZING: 0
BLOOD IN STOOL: 0
SHORTNESS OF BREATH: 0
COLOR CHANGE: 0
SORE THROAT: 0
SINUS PAIN: 0
BACK PAIN: 0
NAUSEA: 0
VOICE CHANGE: 0
CONSTIPATION: 0
TROUBLE SWALLOWING: 0
EYE PAIN: 0

## 2020-05-08 ASSESSMENT — PATIENT HEALTH QUESTIONNAIRE - PHQ9
SUM OF ALL RESPONSES TO PHQ QUESTIONS 1-9: 0
SUM OF ALL RESPONSES TO PHQ9 QUESTIONS 1 & 2: 0
1. LITTLE INTEREST OR PLEASURE IN DOING THINGS: 0
SUM OF ALL RESPONSES TO PHQ QUESTIONS 1-9: 0
2. FEELING DOWN, DEPRESSED OR HOPELESS: 0

## 2020-05-08 NOTE — PROGRESS NOTES
[address] [address]  Dept: [phone]  Dept Fax: [phone]    OfficeProgress/Follow Up Note  Date of patient's visit: 5/8/2020  Patient's Name:  Yissel Umaña YOB: 1965            Patient Care Team:  Marylyn Apley, MD as PCP - General (Internal Medicine)  Marylyn Apley, MD as PCP - [facility]    REASON FOR VISIT:  Same day visit    HISTORY OF PRESENT ILLNESS:      Chief Complaint   Patient presents with    Hematuria     Pt states he has blood in his urine that he noticed yesterday, pt states he has a lot pressure when he urinates and he feels he is not emptying his bladder when he goes      History was obtained from the patient. Yissel Umaña is a 49 y.o. male here today for above. Intermittent gross hematuria. Symptoms started approximately 3 mos ago. Was [facility] Emergency room, urinalysis with large hemoglobin. CT AP with no acute abnormality identified, no renal calculi. He reports symptoms resolved but returned again yesterday, 2-3 episodes. No dysuria, frequency, urgency, flank pain. Does report feeling of incomplete bladder emptying. He denies trauma. No anticoagulation. No new medications. History of tobacco abuse in remission. No weight loss, fatigue, night sweats. Patient was referred to urologist but did not follow up.      PSA 2/06/20:  0.33 ug/L    Patient Active Problem List   Diagnosis    Mild intermittent asthma without complication    Hyperlipidemia    Essential hypertension    Primary insomnia    Gastroesophageal reflux disease with esophagitis    Type 2 diabetes mellitus with complication (HCC)    Pedal edema     MEDICATIONS:      Current Outpatient Medications   Medication Sig Dispense Refill    lisinopril (PRINIVIL;ZESTRIL) 10 MG tablet TAKE 1 TABLET BY MOUTH DAILY 30 tablet 2    aspirin 81 MG chewable tablet CHEW AND SWALLOW 1 TABLET BY Future  -- Urinalysis; Future  -- Culture, Urine; Future  -- Lam Neri MD, Urology, [place]  -- [address]; Future  - Advised prompt follow up for new/worsenign symptoms    3. Essential hypertension  - Controlled, continue present management  -- Basic Metabolic Panel; Future    4. Type 2 diabetes mellitus with complication, without long-term current use of insulin (HCC)  - Hemoglobin A1C; Future    5. History of tobacco abuse    FOLLOW UP AND INSTRUCTIONS:   Return if symptoms worsen or fail to improve. Patient was educated on signs and symptoms which require more urgent evaluation and treatment, instructed to present to emergency room should these develop, he understands and agrees with plan. Discussed use, benefit, and side effects of prescribed medications. All patient questions answered. Patient voiced understanding. Patient given educational materials - see patient instructions    Yaneli BAUTISTA [facility]  5/8/2020, 10:14 AM    Please note that this chart wasgenerated using voice recognition Dragon dictation software. Although every effort was made to ensure the accuracy of this automated transcription, some errors in transcription may have occurred.

## 2020-05-09 LAB
CULTURE: NORMAL
Lab: NORMAL
SPECIMEN DESCRIPTION: NORMAL

## 2020-05-13 ENCOUNTER — HOSPITAL ENCOUNTER (OUTPATIENT)
Dept: CT IMAGING | Age: 55
Discharge: HOME | End: 2020-05-15
Payer: COMMERCIAL

## 2020-05-13 PROCEDURE — 2580000003 HC RX 258: Performed by: PHYSICIAN ASSISTANT

## 2020-05-13 PROCEDURE — 6360000004 HC RX CONTRAST MEDICATION: Performed by: PHYSICIAN ASSISTANT

## 2020-05-13 PROCEDURE — 74178 CT ABD&PLV WO CNTR FLWD CNTR: CPT

## 2020-05-13 RX ORDER — 0.9 % SODIUM CHLORIDE 0.9 %
80 INTRAVENOUS SOLUTION INTRAVENOUS ONCE
Status: COMPLETED | OUTPATIENT
Start: 2020-05-13 | End: 2020-05-13

## 2020-05-13 RX ORDER — SODIUM CHLORIDE 0.9 % (FLUSH) 0.9 %
10 SYRINGE (ML) INJECTION PRN
Status: DISCONTINUED | OUTPATIENT
Start: 2020-05-13 | End: 2020-05-16 | Stop reason: HOSPADM

## 2020-05-13 RX ADMIN — SODIUM CHLORIDE 80 ML: 9 INJECTION, SOLUTION INTRAVENOUS at 08:21

## 2020-05-13 RX ADMIN — IOVERSOL 120 ML: 741 INJECTION INTRA-ARTERIAL; INTRAVENOUS at 08:21

## 2020-05-13 RX ADMIN — Medication 10 ML: at 08:21

## 2020-05-18 ENCOUNTER — VIRTUAL VISIT (OUTPATIENT)
Dept: UROLOGY | Age: 55
End: 2020-05-18
Payer: COMMERCIAL

## 2020-05-18 ENCOUNTER — TELEPHONE (OUTPATIENT)
Dept: UROLOGY | Age: 55
End: 2020-05-18

## 2020-05-18 PROCEDURE — 99204 OFFICE O/P NEW MOD 45 MIN: CPT | Performed by: UROLOGY

## 2020-05-18 RX ORDER — ALBUTEROL SULFATE 90 UG/1
AEROSOL, METERED RESPIRATORY (INHALATION)
Qty: 8.5 G | Refills: 0 | Status: SHIPPED | OUTPATIENT
Start: 2020-05-18 | End: 2021-05-21

## 2020-05-18 ASSESSMENT — ENCOUNTER SYMPTOMS
SHORTNESS OF BREATH: 1
WHEEZING: 0
VOMITING: 0
COLOR CHANGE: 0
EYE PAIN: 0
ABDOMINAL PAIN: 0
COUGH: 0
BACK PAIN: 0
NAUSEA: 0
EYE REDNESS: 0

## 2020-05-18 NOTE — PROGRESS NOTES
[facility]  [facility] - [address]  Dept: [phone]  Dept Fax: [phone] [facility] Urology Office Note - New patient    Patient:  Austyn Hedrick  YOB: 1965  Date: 5/18/2020    The patient is a 49 y.o. male who presents todayfor evaluation of the following problems:   Chief Complaint   Patient presents with    Hematuria     gross, has stopped, had some pressure with urgency, no pain    referred by Kayleen Coffey MD.      HPI  Gross Hematuria:  Patient is here today for gross hematuria which occurred three month(s) ago. It is was intermittent. Clots? Yes. Dysuria? not present. Flank pain? No  Smoking status: quit 9 years ago. Smoked 1.5ppd x 25 years  Anticoagulants? ASA  Personal History of Kidney Stones: yes  Family History of: Bladder Cancer No, Kidney Cancer No, Kidney Stones Yes  Lower urinary tract symptoms: nocturia, 2 times per night    (Patient's old records have been requested, reviewed and summarized in today's note.)    Summary of old records: N/A    Additional History: N/A    Procedures Today: N/A    Last several PSA's:  Lab Results   Component Value Date    PSA 0.33 02/06/2020    PSA 0.32 03/16/2018     Last total testosterone:  No results found for: TESTOSTERONE  Urinalysis today:  No results found for this visit on 05/18/20.     AUA Symptom Score (5/18/2020):  INCOMPLETE EMPTYING: How often have you had the sensation of not emptying your bladder?: About Half the time  FREQUENCY: How often do you have to urinate less than every two hours?: Not at all  INTERMITTENCY: How often have you found you stopped and started again several times when you urinated?: Less than Half the time  URGENCY: How often have you found it difficult to postpone urination?: Almost always  WEAK STREAM: How often have you had a weak urinary stream?: About Half the time  STRAINING: How often have you had to strain to start placed in this encounter. Orders Placed:  No orders of the defined types were placed in this encounter. Ariela Helms MD    Agree with the ROS entered by the MA.

## 2020-06-11 RX ORDER — ATORVASTATIN CALCIUM 40 MG/1
40 TABLET, FILM COATED ORAL DAILY
Qty: 30 TABLET | Refills: 2 | Status: SHIPPED | OUTPATIENT
Start: 2020-06-11 | End: 2020-08-11 | Stop reason: SDUPTHER

## 2020-07-06 RX ORDER — BLOOD SUGAR DIAGNOSTIC
STRIP MISCELLANEOUS
Qty: 200 STRIP | Refills: 0 | Status: SHIPPED | OUTPATIENT
Start: 2020-07-06 | End: 2020-07-14

## 2020-07-14 RX ORDER — LISINOPRIL 10 MG/1
10 TABLET ORAL DAILY
Qty: 30 TABLET | Refills: 11 | Status: SHIPPED | OUTPATIENT
Start: 2020-07-14 | End: 2021-08-13 | Stop reason: SDUPTHER

## 2020-07-14 RX ORDER — BLOOD SUGAR DIAGNOSTIC
STRIP MISCELLANEOUS
Qty: 200 STRIP | Refills: 11 | Status: SHIPPED | OUTPATIENT
Start: 2020-07-14

## 2020-08-11 RX ORDER — ATORVASTATIN CALCIUM 40 MG/1
40 TABLET, FILM COATED ORAL DAILY
Qty: 30 TABLET | Refills: 3 | Status: SHIPPED | OUTPATIENT
Start: 2020-08-11 | End: 2020-12-09 | Stop reason: SDUPTHER

## 2020-08-11 RX ORDER — ASPIRIN 81 MG/1
TABLET, CHEWABLE ORAL
Qty: 30 TABLET | Refills: 3 | Status: SHIPPED | OUTPATIENT
Start: 2020-08-11 | End: 2020-12-09 | Stop reason: SDUPTHER

## 2020-08-11 NOTE — TELEPHONE ENCOUNTER
Medication: lipitor, asprin  Last visit: 5/8/20  Next visit: Visit date not found  Last refill: lipitor 6/11/20, asprin 3/19/20  Pharmacy: [facility]

## 2020-11-09 RX ORDER — BUDESONIDE AND FORMOTEROL FUMARATE DIHYDRATE 80; 4.5 UG/1; UG/1
2 AEROSOL RESPIRATORY (INHALATION) 2 TIMES DAILY
Qty: 1 INHALER | Refills: 3 | Status: SHIPPED | OUTPATIENT
Start: 2020-11-09 | End: 2021-01-07 | Stop reason: ALTCHOICE

## 2020-11-09 NOTE — TELEPHONE ENCOUNTER
Medication: symbicort  Last visit: 05/08/20  Next visit: 1/7/2021  Last refill: 02/12/20  Pharmacy: [facility]

## 2020-11-25 RX ORDER — ALBUTEROL SULFATE 90 UG/1
AEROSOL, METERED RESPIRATORY (INHALATION)
Qty: 8.5 G | Refills: 3 | Status: SHIPPED | OUTPATIENT
Start: 2020-11-25 | End: 2021-05-28 | Stop reason: SDUPTHER

## 2020-11-25 NOTE — TELEPHONE ENCOUNTER
Medication: Proair HFA  Last visit: 5/8/20  Next visit: 1/7/2021  Last refill: 12/14/18  Pharmacy: [facility]

## 2020-12-09 RX ORDER — ASPIRIN 81 MG/1
TABLET, CHEWABLE ORAL
Qty: 30 TABLET | Refills: 0 | Status: SHIPPED | OUTPATIENT
Start: 2020-12-09 | End: 2021-01-08

## 2020-12-09 RX ORDER — ATORVASTATIN CALCIUM 40 MG/1
40 TABLET, FILM COATED ORAL DAILY
Qty: 30 TABLET | Refills: 0 | Status: SHIPPED | OUTPATIENT
Start: 2020-12-09 | End: 2021-01-08

## 2020-12-09 NOTE — TELEPHONE ENCOUNTER
Medication: atorvastatin  asa  Last visit: 05/2020 w/Sudheer  Next visit: 1/7/2021 W/Dr Jordyn Anderson  Last refill: 05/2020    Pharmacy: [facility] See Dr Jordyn Anderson in January

## 2020-12-30 DIAGNOSIS — E11.8 TYPE 2 DIABETES MELLITUS WITH COMPLICATION (HCC): ICD-10-CM

## 2020-12-30 RX ORDER — LANCETS 30 GAUGE
1 EACH MISCELLANEOUS DAILY
Qty: 100 EACH | Refills: 11 | Status: CANCELLED | OUTPATIENT
Start: 2020-12-30

## 2020-12-30 NOTE — TELEPHONE ENCOUNTER
Medication: Lancets  Last visit: 5/8/2020  Next visit: 1/7/2021  Last refill: 4/26/2018  Pharmacy: [facility]

## 2021-01-07 ENCOUNTER — OFFICE VISIT (OUTPATIENT)
Dept: INTERNAL MEDICINE CLINIC | Age: 56
End: 2021-01-07
Payer: COMMERCIAL

## 2021-01-07 VITALS
TEMPERATURE: 97.2 F | HEIGHT: 75 IN | WEIGHT: 205.6 LBS | SYSTOLIC BLOOD PRESSURE: 118 MMHG | DIASTOLIC BLOOD PRESSURE: 82 MMHG | BODY MASS INDEX: 25.56 KG/M2

## 2021-01-07 DIAGNOSIS — E11.8 TYPE 2 DIABETES MELLITUS WITH COMPLICATION, WITHOUT LONG-TERM CURRENT USE OF INSULIN (HCC): Primary | ICD-10-CM

## 2021-01-07 DIAGNOSIS — E11.8 TYPE 2 DIABETES MELLITUS WITH COMPLICATION (HCC): ICD-10-CM

## 2021-01-07 DIAGNOSIS — J45.20 MILD INTERMITTENT ASTHMA WITHOUT COMPLICATION: ICD-10-CM

## 2021-01-07 DIAGNOSIS — Z12.11 COLON CANCER SCREENING: ICD-10-CM

## 2021-01-07 LAB — HBA1C MFR BLD: 5.8 %

## 2021-01-07 PROCEDURE — 83036 HEMOGLOBIN GLYCOSYLATED A1C: CPT | Performed by: INTERNAL MEDICINE

## 2021-01-07 PROCEDURE — 99214 OFFICE O/P EST MOD 30 MIN: CPT | Performed by: INTERNAL MEDICINE

## 2021-01-07 RX ORDER — LIDOCAINE HYDROCHLORIDE 10 MG/ML
INJECTION, SOLUTION INFILTRATION; PERINEURAL
COMMUNITY
End: 2021-04-22

## 2021-01-07 RX ORDER — BUDESONIDE AND FORMOTEROL FUMARATE DIHYDRATE 160; 4.5 UG/1; UG/1
2 AEROSOL RESPIRATORY (INHALATION) 2 TIMES DAILY
Qty: 3 INHALER | Refills: 1 | Status: SHIPPED | OUTPATIENT
Start: 2021-01-07

## 2021-01-07 RX ORDER — LANCETS 30 GAUGE
1 EACH MISCELLANEOUS DAILY
Qty: 100 EACH | Refills: 11 | Status: SHIPPED | OUTPATIENT
Start: 2021-01-07

## 2021-01-07 ASSESSMENT — PATIENT HEALTH QUESTIONNAIRE - PHQ9
SUM OF ALL RESPONSES TO PHQ QUESTIONS 1-9: 0
SUM OF ALL RESPONSES TO PHQ QUESTIONS 1-9: 0
1. LITTLE INTEREST OR PLEASURE IN DOING THINGS: 0

## 2021-01-07 NOTE — PROGRESS NOTES
Subjective:      Patient ID: Mary Goins is a 48 y.o. male. HPI patient is here for evaluation multiple medical problems. He has diabetes, COPD, hypertension. He is compliant with his diet and medication. He quit smoking 9 years back. He mentioned that he is compliant with Symbicort , he feels that his shortness of breath is getting worse. He works in a [employer], where using mask is mandatory. He mentioned that since he is using his face mask, his symptoms of shortness of breath is getting worse, is requiring rescue inhaler very often  Patient underwent pulmonary function test done in February, suggestive of restrictive lung disease      Review of Systems   Constitutional: Negative for activity change, appetite change, chills and diaphoresis. HENT: Negative for congestion, dental problem, ear discharge, facial swelling and hearing loss. Respiratory: Positive for shortness of breath (Uses rescue inhaler almost daily) and wheezing (Occasional). Negative for apnea, cough and chest tightness. Cardiovascular: Negative for chest pain and leg swelling. Gastrointestinal: Negative for abdominal distention, abdominal pain, constipation and diarrhea. Genitourinary: Negative for difficulty urinating, dysuria, enuresis, flank pain and frequency. Musculoskeletal: Negative for arthralgias, back pain, gait problem and joint swelling. Skin: Negative for color change, pallor and rash. Lipomas present under skin of both arms, mobile   Neurological: Negative for dizziness, seizures, facial asymmetry, light-headedness, numbness and headaches. Psychiatric/Behavioral: Negative for agitation, behavioral problems, confusion, decreased concentration and dysphoric mood. Objective:   Physical Exam  Vitals signs and nursing note reviewed. Constitutional:       General: He is not in acute distress. Appearance: He is well-developed. He is not diaphoretic.    HENT: Head: Normocephalic and atraumatic. Mouth/Throat:      Pharynx: No oropharyngeal exudate. Eyes:      General: No scleral icterus. Right eye: No discharge. Left eye: No discharge. Conjunctiva/sclera: Conjunctivae normal.      Pupils: Pupils are equal, round, and reactive to light. Neck:      Musculoskeletal: Normal range of motion and neck supple. Thyroid: No thyromegaly. Vascular: No JVD. Trachea: No tracheal deviation. Cardiovascular:      Rate and Rhythm: Normal rate. Heart sounds: Normal heart sounds. No murmur. No gallop. Pulmonary:      Effort: Pulmonary effort is normal. No respiratory distress. Breath sounds: No stridor. Wheezing present. No rales. Abdominal:      General: Bowel sounds are normal. There is no distension. Palpations: Abdomen is soft. Tenderness: There is no abdominal tenderness. There is no guarding or rebound. Musculoskeletal: Normal range of motion. General: No tenderness. Comments: Small lipomas around 2 x 3 cm in size, mobile, nontender, present in the skin of both arms   Skin:     General: Skin is warm and dry. Findings: No erythema or rash. Neurological:      Mental Status: He is alert and oriented to person, place, and time. Assessment / Plan:   1. Type 2 diabetes mellitus with complication, without long-term current use of insulin (HCC)  -  DIABETES FOOT EXAM  - POCT glycosylated hemoglobin (Hb A1C)    2. Colon cancer screening  - Cologuard (For External Results Only); Future    3. Type 2 diabetes mellitus with complication (HCC)  Controlled   - Lancets MISC; 1 each by Does not apply route daily  Dispense: 100 each; Refill: 11    4. Mild intermittent asthma without complication  - budesonide-formoterol (SYMBICORT) 160-4.5 MCG/ACT AERO; Inhale 2 puffs into the lungs 2 times daily  Dispense: 3 Inhaler; Refill: 1  Increasing dose of Symbicort    · No follow-ups on file. · Reviewed prior labs and health maintenance. · Discussed use, benefit, and side effects of prescribed medications. Barriers to medication compliance addressed. All patient questions answered. Pt voiced understanding. Jade Ladd MD  [facility]  1/9/2021, 9:57 PM    Please note that this chart was generated using voice recognition Dragon dictation software. Although every effort was made to ensure the accuracy of this automated transcription, some errors in transcription may have occurred. Visit Information    Have you changed or started any medications since your last visit including any over-the-counter medicines, vitamins, or herbal medicines? no   Are you having any side effects from any of your medications? -  no  Have you stopped taking any of your medications? Is so, why? -  no    Have you seen any other physician or provider since your last visit? Yes - Records Requested  Have you had any other diagnostic tests since your last visit? No  Have you been seen in the emergency room and/or had an admission to a hospital since we last saw you? No  Have you had your routine dental cleaning in the past 6 months? no    Have you activated your [employer] account? If not, what are your barriers?  Yes     Patient Care Team:  Jade Ladd MD as PCP - General (Internal Medicine)  Jade Ladd MD as PCP - [facility]    Medical History Review  Past Medical, Family, and Social History reviewed and does not contribute to the patient presenting condition    Health Maintenance   Topic Date Due    Hepatitis C screen  1965    Pneumococcal 0-64 years Vaccine (1 of 1 - PPSV23) 09/13/1972    Diabetic retinal exam  09/13/1976    HIV screen  09/13/1981    Hepatitis B vaccine (1 of 3 - Risk 3-dose series) 09/13/1985    DTaP/Tdap/Td vaccine (1 - Tdap) 09/13/1985    Shingles Vaccine (1 of 2) 09/13/2016    Diabetic foot exam  06/26/2019  Colon Cancer Screen FIT/FOBT  05/21/2020    Flu vaccine (1) 09/01/2020    Lipid screen  02/06/2021    Diabetic microalbuminuria test  02/07/2021    A1C test (Diabetic or Prediabetic)  05/08/2021    Potassium monitoring  05/08/2021    Creatinine monitoring  05/08/2021    Hepatitis A vaccine  Aged Out    Hib vaccine  Aged Out    Meningococcal (ACWY) vaccine  Aged Out

## 2021-01-08 DIAGNOSIS — E11.8 TYPE 2 DIABETES MELLITUS WITH COMPLICATION, WITHOUT LONG-TERM CURRENT USE OF INSULIN (HCC): ICD-10-CM

## 2021-01-08 DIAGNOSIS — E78.5 HYPERLIPIDEMIA, UNSPECIFIED HYPERLIPIDEMIA TYPE: ICD-10-CM

## 2021-01-08 RX ORDER — ATORVASTATIN CALCIUM 40 MG/1
TABLET, FILM COATED ORAL
Qty: 30 TABLET | Refills: 0 | Status: SHIPPED | OUTPATIENT
Start: 2021-01-08 | End: 2021-02-08

## 2021-01-08 RX ORDER — ASPIRIN 81 MG/1
TABLET, CHEWABLE ORAL
Qty: 30 TABLET | Refills: 0 | Status: SHIPPED | OUTPATIENT
Start: 2021-01-08 | End: 2021-02-08

## 2021-01-09 ASSESSMENT — ENCOUNTER SYMPTOMS
CHEST TIGHTNESS: 0
BACK PAIN: 0
APNEA: 0
SHORTNESS OF BREATH: 1
DIARRHEA: 0
CONSTIPATION: 0
COLOR CHANGE: 0
COUGH: 0
FACIAL SWELLING: 0
ABDOMINAL DISTENTION: 0
ABDOMINAL PAIN: 0
WHEEZING: 1

## 2021-02-05 DIAGNOSIS — Z12.11 COLON CANCER SCREENING: ICD-10-CM

## 2021-02-07 DIAGNOSIS — E78.5 HYPERLIPIDEMIA, UNSPECIFIED HYPERLIPIDEMIA TYPE: ICD-10-CM

## 2021-02-07 DIAGNOSIS — E11.8 TYPE 2 DIABETES MELLITUS WITH COMPLICATION, WITHOUT LONG-TERM CURRENT USE OF INSULIN (HCC): ICD-10-CM

## 2021-02-08 RX ORDER — ATORVASTATIN CALCIUM 40 MG/1
TABLET, FILM COATED ORAL
Qty: 30 TABLET | Refills: 0 | Status: SHIPPED | OUTPATIENT
Start: 2021-02-08

## 2021-02-08 RX ORDER — ASPIRIN 81 MG/1
TABLET, CHEWABLE ORAL
Qty: 30 TABLET | Refills: 0 | Status: SHIPPED | OUTPATIENT
Start: 2021-02-08

## 2021-04-22 ENCOUNTER — OFFICE VISIT (OUTPATIENT)
Dept: INTERNAL MEDICINE CLINIC | Age: 56
End: 2021-04-22
Payer: COMMERCIAL

## 2021-04-22 VITALS
DIASTOLIC BLOOD PRESSURE: 76 MMHG | WEIGHT: 206.6 LBS | SYSTOLIC BLOOD PRESSURE: 120 MMHG | HEIGHT: 75 IN | TEMPERATURE: 98.1 F | HEART RATE: 80 BPM | BODY MASS INDEX: 25.69 KG/M2 | OXYGEN SATURATION: 99 %

## 2021-04-22 DIAGNOSIS — E11.8 TYPE 2 DIABETES MELLITUS WITH COMPLICATION (HCC): ICD-10-CM

## 2021-04-22 DIAGNOSIS — G25.2 COARSE TREMORS: ICD-10-CM

## 2021-04-22 DIAGNOSIS — F32.A ANXIETY AND DEPRESSION: Primary | ICD-10-CM

## 2021-04-22 DIAGNOSIS — F41.9 ANXIETY AND DEPRESSION: Primary | ICD-10-CM

## 2021-04-22 DIAGNOSIS — F41.0 PANIC ATTACKS: ICD-10-CM

## 2021-04-22 DIAGNOSIS — J45.20 MILD INTERMITTENT ASTHMA WITHOUT COMPLICATION: ICD-10-CM

## 2021-04-22 LAB — HBA1C MFR BLD: 5.8 %

## 2021-04-22 PROCEDURE — 99213 OFFICE O/P EST LOW 20 MIN: CPT | Performed by: NURSE PRACTITIONER

## 2021-04-22 PROCEDURE — 83036 HEMOGLOBIN GLYCOSYLATED A1C: CPT | Performed by: NURSE PRACTITIONER

## 2021-04-22 RX ORDER — CITALOPRAM 10 MG/1
10 TABLET ORAL DAILY
Qty: 30 TABLET | Refills: 3 | Status: SHIPPED | OUTPATIENT
Start: 2021-04-22 | End: 2021-05-21

## 2021-04-22 RX ORDER — FLUTICASONE PROPIONATE 50 MCG
1 SPRAY, SUSPENSION (ML) NASAL PRN
COMMUNITY

## 2021-04-22 RX ORDER — HYDROXYZINE HYDROCHLORIDE 25 MG/1
25 TABLET, FILM COATED ORAL 4 TIMES DAILY PRN
Qty: 120 TABLET | Refills: 0 | Status: SHIPPED | OUTPATIENT
Start: 2021-04-22 | End: 2021-05-02

## 2021-04-22 ASSESSMENT — ENCOUNTER SYMPTOMS
WHEEZING: 0
RHINORRHEA: 0
NAUSEA: 0
BLOOD IN STOOL: 0
VOMITING: 0
COUGH: 0
SINUS PAIN: 0
CONSTIPATION: 0
SHORTNESS OF BREATH: 0
SORE THROAT: 0
CHEST TIGHTNESS: 0
ABDOMINAL PAIN: 0
TROUBLE SWALLOWING: 0
DIARRHEA: 0

## 2021-04-22 ASSESSMENT — PATIENT HEALTH QUESTIONNAIRE - PHQ9
SUM OF ALL RESPONSES TO PHQ QUESTIONS 1-9: 14
10. IF YOU CHECKED OFF ANY PROBLEMS, HOW DIFFICULT HAVE THESE PROBLEMS MADE IT FOR YOU TO DO YOUR WORK, TAKE CARE OF THINGS AT HOME, OR GET ALONG WITH OTHER PEOPLE: 2
7. TROUBLE CONCENTRATING ON THINGS, SUCH AS READING THE NEWSPAPER OR WATCHING TELEVISION: 3
8. MOVING OR SPEAKING SO SLOWLY THAT OTHER PEOPLE COULD HAVE NOTICED. OR THE OPPOSITE, BEING SO FIGETY OR RESTLESS THAT YOU HAVE BEEN MOVING AROUND A LOT MORE THAN USUAL: 3
SUM OF ALL RESPONSES TO PHQ9 QUESTIONS 1 & 2: 4
3. TROUBLE FALLING OR STAYING ASLEEP: 3
4. FEELING TIRED OR HAVING LITTLE ENERGY: 0

## 2021-04-22 ASSESSMENT — COLUMBIA-SUICIDE SEVERITY RATING SCALE - C-SSRS: 2. HAVE YOU ACTUALLY HAD ANY THOUGHTS OF KILLING YOURSELF?: NO

## 2021-04-22 NOTE — PROGRESS NOTES
Visit Information    Have you changed or started any medications since your last visit including any over-the-counter medicines, vitamins, or herbal medicines? no   Are you having any side effects from any of your medications? -  no  Have you stopped taking any of your medications? Is so, why? -  no    Have you seen any other physician or provider since your last visit? No  Have you had any other diagnostic tests since your last visit? No  Have you been seen in the emergency room and/or had an admission to a hospital since we last saw you? No  Have you had your routine dental cleaning in the past 6 months? no    Have you activated your [employer] account? If not, what are your barriers?  Yes     Patient Care Team:  Corey Huddleston MD as PCP - General (Internal Medicine)  Corey Huddleston MD as PCP - [facility]    Medical History Review  Past Medical, Family, and Social History reviewed and does not contribute to the patient presenting condition    Health Maintenance   Topic Date Due    Hepatitis C screen  Never done    Pneumococcal 0-64 years Vaccine (1 of 1 - PPSV23) Never done    Diabetic retinal exam  Never done    HIV screen  Never done    COVID-19 Vaccine (1) Never done    Hepatitis B vaccine (1 of 3 - Risk 3-dose series) Never done    DTaP/Tdap/Td vaccine (1 - Tdap) Never done    Shingles Vaccine (1 of 2) Never done    Lipid screen  02/06/2021    Diabetic microalbuminuria test  02/07/2021    Potassium monitoring  05/08/2021    Creatinine monitoring  05/08/2021    Flu vaccine (Season Ended) 09/01/2021    Diabetic foot exam  01/07/2022    A1C test (Diabetic or Prediabetic)  04/22/2022    Colon cancer screen fecal DNA test (Cologuard)  01/28/2024    Hepatitis A vaccine  Aged Out    Hib vaccine  Aged Out    Meningococcal (ACWY) vaccine  Aged Out         [address] [address]  Dept: [phone]  Dept Fax: [phone] Take 1 tablet by mouth daily      citalopram (CELEXA) 10 MG tablet Take 1 tablet by mouth daily 30 tablet 3    hydrOXYzine (ATARAX) 25 MG tablet Take 1 tablet by mouth 4 times daily as needed for Anxiety 120 tablet 0    atorvastatin (LIPITOR) 40 MG tablet TAKE 1 TABLET BY MOUTH DAILY 30 tablet 0    aspirin 81 MG chewable tablet CHEW AND SWALLOW 1 TABLET BY MOUTH DAILY 30 tablet 0    Lancets MISC 1 each by Does not apply route daily 100 each 11    budesonide-formoterol (SYMBICORT) 160-4.5 MCG/ACT AERO Inhale 2 puffs into the lungs 2 times daily 3 Inhaler 1    albuterol sulfate HFA (PROAIR HFA) 108 (90 Base) MCG/ACT inhaler INHALE 2 PUFFS INTO THE LUNGS TWICE DAILY AS NEEDED FOR WHEEZING 8.5 g 3    metFORMIN (GLUCOPHAGE) 500 MG tablet TAKE 1 TABLET BY MOUTH TWICE DAILY WITH MEALS 60 tablet 11    ONETOUCH ULTRA strip USE TO TEST TWICE DAILY AS DIRECTED 200 strip 11    lisinopril (PRINIVIL;ZESTRIL) 10 MG tablet TAKE 1 TABLET BY MOUTH DAILY 30 tablet 11    famotidine (PEPCID) 20 MG tablet Take 1 tablet by mouth 2 times daily 60 tablet 5    glucose monitoring kit (FREESTYLE) monitoring kit 1 kit by Does not apply route daily 1 kit 0    Budesonide-Formoterol Fumarate (SYMBICORT IN) Inhale 2 puffs into the lungs 2 times daily      albuterol sulfate  (90 Base) MCG/ACT inhaler INHALE 2 PUFFS INTO THE LUNGS TWICE DAILY AS NEEDED FOR WHEEZING (Patient not taking: Reported on 4/22/2021) 8.5 g 0    tiotropium (SPIRIVA HANDIHALER) 18 MCG inhalation capsule Inhale 1 capsule into the lungs daily (Patient not taking: Reported on 1/7/2021) 90 capsule 1     No current facility-administered medications for this visit. ALLERGIES:      Allergies   Allergen Reactions    Morphine Other (See Comments)     Jaw locks up, shakes  Lock jaw    Oxycodone-Acetaminophen     Codeine Nausea And Vomiting       SOCIAL HISTORY   Reviewed and no change from previous record.      Ambrose Pritchett  reports that he quit smoking about 10 range of motion. General: No swelling, tenderness or deformity. Skin:     General: Skin is warm and dry. Neurological:      General: No focal deficit present. Mental Status: He is alert and oriented to person, place, and time. Psychiatric:         Mood and Affect: Mood is anxious and depressed. Speech: Speech normal.         Behavior: Behavior normal.         Thought Content: Thought content normal. Thought content does not include suicidal ideation. Thought content does not include homicidal or suicidal plan. Cognition and Memory: Cognition and memory normal.         Judgment: Judgment normal.          ASSESSMENT AND PLAN:      1. Anxiety and depression  - citalopram (CELEXA) 10 MG tablet; Take 1 tablet by mouth daily  Dispense: 30 tablet; Refill: 3  - Amb External Referral To Psychiatry  - hydrOXYzine (ATARAX) 25 MG tablet; Take 1 tablet by mouth 4 times daily as needed for Anxiety  Dispense: 120 tablet; Refill: 0    2. Coarse tremors  - CT HEAD WO CONTRAST; Future    3. Panic attacks  - hydrOXYzine (ATARAX) 25 MG tablet; Take 1 tablet by mouth 4 times daily as needed for Anxiety  Dispense: 120 tablet; Refill: 0      FOLLOW UP AND INSTRUCTIONS:   Return in about 4 weeks (around 5/20/2021). Discussed use, benefit, and side effects of prescribed medications. All patient questions answered. Patient voiced understanding. Patient given educational materials - see patient instructions    TORI Romo - CNP   [facility]  4/27/2021, 6:44 AM    Please note that this chart wasgenerated using voice recognition Dragon dictation software. Although every effort was made to ensure the accuracy of this automated transcription, some errors in transcription may have occurred.

## 2021-04-26 ENCOUNTER — TELEPHONE (OUTPATIENT)
Dept: INTERNAL MEDICINE CLINIC | Age: 56
End: 2021-04-26

## 2021-04-26 NOTE — TELEPHONE ENCOUNTER
Pt called & stated that Chery Agosto referred pt to external pysch & pt states that he will be seening Nancy Carlos @ [facility] [address] in [facility]  Ph # [phone] or [phone]

## 2021-05-11 ENCOUNTER — HOSPITAL ENCOUNTER (OUTPATIENT)
Dept: CT IMAGING | Age: 56
Discharge: HOME | End: 2021-05-13
Payer: COMMERCIAL

## 2021-05-11 DIAGNOSIS — G25.2 COARSE TREMORS: ICD-10-CM

## 2021-05-11 PROCEDURE — 70450 CT HEAD/BRAIN W/O DYE: CPT

## 2021-05-12 ENCOUNTER — TELEPHONE (OUTPATIENT)
Dept: INTERNAL MEDICINE CLINIC | Age: 56
End: 2021-05-12

## 2021-05-12 NOTE — TELEPHONE ENCOUNTER
I have not seen this patient since February of last year, patient was following with Silver Oconnor.

## 2021-05-21 ENCOUNTER — OFFICE VISIT (OUTPATIENT)
Dept: INTERNAL MEDICINE CLINIC | Age: 56
End: 2021-05-21
Payer: COMMERCIAL

## 2021-05-21 VITALS
WEIGHT: 209 LBS | TEMPERATURE: 98.4 F | SYSTOLIC BLOOD PRESSURE: 122 MMHG | OXYGEN SATURATION: 96 % | BODY MASS INDEX: 25.99 KG/M2 | HEIGHT: 75 IN | HEART RATE: 88 BPM | DIASTOLIC BLOOD PRESSURE: 68 MMHG

## 2021-05-21 DIAGNOSIS — I10 ESSENTIAL HYPERTENSION: ICD-10-CM

## 2021-05-21 DIAGNOSIS — Z13.29 SCREENING FOR THYROID DISORDER: Primary | ICD-10-CM

## 2021-05-21 DIAGNOSIS — G47.00 INSOMNIA, UNSPECIFIED TYPE: ICD-10-CM

## 2021-05-21 DIAGNOSIS — R90.89 ABNORMAL CT OF BRAIN: ICD-10-CM

## 2021-05-21 DIAGNOSIS — E78.5 HYPERLIPIDEMIA, UNSPECIFIED HYPERLIPIDEMIA TYPE: ICD-10-CM

## 2021-05-21 DIAGNOSIS — G25.2 COARSE TREMORS: ICD-10-CM

## 2021-05-21 DIAGNOSIS — E11.8 TYPE 2 DIABETES MELLITUS WITH COMPLICATION (HCC): ICD-10-CM

## 2021-05-21 PROCEDURE — 99214 OFFICE O/P EST MOD 30 MIN: CPT | Performed by: NURSE PRACTITIONER

## 2021-05-21 RX ORDER — ARIPIPRAZOLE 10 MG/1
15 TABLET ORAL DAILY
COMMUNITY
Start: 2021-05-12

## 2021-05-21 NOTE — PROGRESS NOTES
Visit Information    Have you changed or started any medications since your last visit including any over-the-counter medicines, vitamins, or herbal medicines? no   Are you having any side effects from any of your medications? -  no  Have you stopped taking any of your medications? Is so, why? -  no    Have you seen any other physician or provider since your last visit? Yes - Records Obtained  Have you had any other diagnostic tests since your last visit? Yes - Records Obtained  Have you been seen in the emergency room and/or had an admission to a hospital since we last saw you? No  Have you had your routine dental cleaning in the past 6 months? no    Have you activated your meevl account? If not, what are your barriers?  Yes     Patient Care Team:  Conrad Zamora MD as PCP - General (Internal Medicine)  Conrad Zamora MD as PCP - [facility]    Medical History Review  Past Medical, Family, and Social History reviewed and does contribute to the patient presenting condition    Health Maintenance   Topic Date Due    Hepatitis C screen  Never done    Pneumococcal 0-64 years Vaccine (1 of 2 - PPSV23) Never done    Diabetic retinal exam  Never done    HIV screen  Never done    Hepatitis B vaccine (1 of 3 - Risk 3-dose series) Never done    DTaP/Tdap/Td vaccine (1 - Tdap) Never done    Shingles Vaccine (1 of 2) Never done    Lipid screen  02/06/2021    Diabetic microalbuminuria test  02/07/2021    Potassium monitoring  05/08/2021    Creatinine monitoring  05/08/2021    COVID-19 Vaccine (2 - Moderna 2-dose series) 06/13/2021    Flu vaccine (Season Ended) 09/01/2021    Diabetic foot exam  01/07/2022    A1C test (Diabetic or Prediabetic)  04/22/2022    Colon cancer screen fecal DNA test (Cologuard)  01/28/2024    Hepatitis A vaccine  Aged Out    Hib vaccine  Aged Out    Meningococcal (ACWY) vaccine  Aged Out         [address] [address]  Dept: [phone]  Dept Fax: [phone]    OfficeProgress/Follow Up Note  Date of patient's visit: 5/27/2021  Patient's Name:  Waldo Del Rosario YOB: 1965            Patient Care Team:  Lovely Carrel, MD as PCP - General (Internal Medicine)  Lovely Carrel, MD as PCP - [facility] Empaneled Provider    REASON FOR VISIT:  Routine outpatient follow up    HISTORY OF PRESENT ILLNESS:        History was obtained from the patient. Waldo Del Rosario is a 48 y.o. male here today for Mental Health Problem (anxiety/depression)     Increased anxiety and depression symptoms due to increased life stressors. Discussed the benefit of speaking to a psychologist for talk therapy. Patient has seen a psychiatrist in the past. He was taken off of Celexa and placed on Abilify.      Patient Active Problem List   Diagnosis    Mild intermittent asthma without complication    Hyperlipidemia    Essential hypertension    Primary insomnia    Gastroesophageal reflux disease with esophagitis    Type 2 diabetes mellitus with complication (HCC)    Pedal edema       Health Maintenance Due   Topic Date Due    Hepatitis C screen  Never done    Pneumococcal 0-64 years Vaccine (1 of 2 - PPSV23) Never done    Diabetic retinal exam  Never done    HIV screen  Never done    Hepatitis B vaccine (1 of 3 - Risk 3-dose series) Never done    DTaP/Tdap/Td vaccine (1 - Tdap) Never done    Shingles Vaccine (1 of 2) Never done    Lipid screen  02/06/2021    Diabetic microalbuminuria test  02/07/2021    Potassium monitoring  05/08/2021    Creatinine monitoring  05/08/2021       MEDICATIONS:      Current Outpatient Medications   Medication Sig Dispense Refill    ARIPiprazole (ABILIFY) 10 MG tablet TAKE 1 TABLET BY MOUTH EVERY DAY      fluticasone (FLONASE) 50 MCG/ACT nasal spray 1 spray by Each Nostril route as needed for Rhinitis      Cetirizine HCl (ZYRTEC ALLERGY PO) Take 1 tablet by mouth daily      atorvastatin swelling. Gastrointestinal: Negative for abdominal pain, blood in stool, constipation, diarrhea, nausea and vomiting. Endocrine: Negative for polydipsia, polyphagia and polyuria. Genitourinary: Negative for difficulty urinating, dysuria and flank pain. Musculoskeletal: Negative for arthralgias and myalgias. Skin: Negative for rash and wound. Neurological: Positive for tremors. Negative for dizziness, syncope and weakness. Psychiatric/Behavioral: Positive for dysphoric mood and sleep disturbance. Negative for agitation, behavioral problems, confusion, self-injury and suicidal ideas. The patient is nervous/anxious. All other systems reviewed and are negative. PHYSICAL EXAM:      Vitals:    05/21/21 0945   BP: 122/68   Pulse: 88   Temp: 98.4 °F (36.9 °C)   SpO2: 96%   Weight: 209 lb (94.8 kg)   Height: 6' 3\" (1.905 m)     BP Readings from Last 3 Encounters:   05/21/21 122/68   04/22/21 120/76   01/07/21 118/82      Wt Readings from Last 3 Encounters:   05/21/21 209 lb (94.8 kg)   04/22/21 206 lb 9.6 oz (93.7 kg)   01/07/21 205 lb 9.6 oz (93.3 kg)       Physical Exam  Vitals reviewed. Constitutional:       Appearance: Normal appearance. HENT:      Head: Normocephalic. Eyes:      General:         Right eye: No discharge. Left eye: No discharge. Extraocular Movements: Extraocular movements intact. Conjunctiva/sclera: Conjunctivae normal.      Pupils: Pupils are equal, round, and reactive to light. Cardiovascular:      Rate and Rhythm: Normal rate and regular rhythm. Pulses: Normal pulses. Heart sounds: Normal heart sounds. Pulmonary:      Effort: Pulmonary effort is normal.      Breath sounds: Normal breath sounds. Abdominal:      General: Bowel sounds are normal.      Palpations: Abdomen is soft. Tenderness: There is no right CVA tenderness or left CVA tenderness. Musculoskeletal:         General: No swelling, tenderness or deformity.  Normal range of motion. Skin:     General: Skin is warm and dry. Neurological:      General: No focal deficit present. Mental Status: He is alert and oriented to person, place, and time. Psychiatric:         Mood and Affect: Mood normal.         Behavior: Behavior normal.         Thought Content: Thought content normal.         Judgment: Judgment normal.          LABORATORY FINDINGS:    CBC:  Lab Results   Component Value Date    WBC 6.0 05/08/2020    HGB 15.1 05/08/2020     05/08/2020       BMP:    Lab Results   Component Value Date     05/08/2020    K 4.4 05/08/2020    CL 99 05/08/2020    CO2 24 05/08/2020    BUN 13 05/08/2020    CREATININE 0.83 05/08/2020    GLUCOSE 102 05/08/2020       HEMOGLOBIN A1C:   Lab Results   Component Value Date    LABA1C 5.8 04/22/2021       FASTING LIPID PANEL:  Lab Results   Component Value Date    CHOL 161 02/06/2020    HDL 41 02/06/2020    TRIG 376 (H) 02/06/2020       ASSESSMENT AND PLAN:      1. Screening for thyroid disorder  - TSH With Reflex Ft4; Future    2. Coarse tremors  - TSH With Reflex Ft4; Future  - Vitamin B12 & Folate; Future  - MRI BRAIN WO CONTRAST; Future  - [place], [place]    3. Abnormal CT of brain  - MRI BRAIN WO CONTRAST; Future  - [place], [place]    4. Essential hypertension  - CBC With Auto Differential; Future  - Comprehensive Metabolic Panel; Future    5. Type 2 diabetes mellitus with complication (HCC)  - CBC With Auto Differential; Future  - Comprehensive Metabolic Panel; Future    6. Hyperlipidemia, unspecified hyperlipidemia type  - Lipid Panel; Future    7. Insomnia, unspecified type  - Comprehensive Metabolic Panel; Future  - TSH With Reflex Ft4; Future  - Vitamin B12 & Folate; Future      FOLLOW UP AND INSTRUCTIONS:   Return in about 2 months (around 7/21/2021) for follow up labs.     Ambrose Pritchett received counseling on the following healthy behaviors: nutrition, exercise and medication adherence    Discussed use, benefit, and side effects of prescribed medications. Barriers to medication compliance addressed. All patient questions answered. Patient voiced understanding. Patient given educational materials - see patient instructions    TORI Amanda - FARIBA   [facility]  5/27/2021, 5:30 PM    Please note that this chart was generated using voice recognition Dragon dictation software. Although everyeffort was made to ensure the accuracy of this automated transcription, some errors in transcription may have occurred.

## 2021-05-24 ENCOUNTER — TELEPHONE (OUTPATIENT)
Dept: INTERNAL MEDICINE CLINIC | Age: 56
End: 2021-05-24

## 2021-05-24 DIAGNOSIS — F32.A ANXIETY AND DEPRESSION: Primary | ICD-10-CM

## 2021-05-24 DIAGNOSIS — F41.9 ANXIETY AND DEPRESSION: Primary | ICD-10-CM

## 2021-05-27 ASSESSMENT — ENCOUNTER SYMPTOMS
ABDOMINAL PAIN: 0
WHEEZING: 0
BLOOD IN STOOL: 0
SORE THROAT: 0
CHEST TIGHTNESS: 0
TROUBLE SWALLOWING: 0
CONSTIPATION: 0
COUGH: 0
SINUS PAIN: 0
RHINORRHEA: 0
SHORTNESS OF BREATH: 0
VOMITING: 0
DIARRHEA: 0
NAUSEA: 0

## 2021-05-28 DIAGNOSIS — J45.20 MILD INTERMITTENT ASTHMA WITHOUT COMPLICATION: ICD-10-CM

## 2021-05-28 RX ORDER — ALBUTEROL SULFATE 90 UG/1
AEROSOL, METERED RESPIRATORY (INHALATION)
Qty: 8.5 G | Refills: 3 | Status: SHIPPED | OUTPATIENT
Start: 2021-05-28 | End: 2022-04-04

## 2021-06-09 ENCOUNTER — HOSPITAL ENCOUNTER (OUTPATIENT)
Age: 56
Discharge: HOME | End: 2021-06-09
Payer: COMMERCIAL

## 2021-06-09 ENCOUNTER — HOSPITAL ENCOUNTER (OUTPATIENT)
Dept: MRI IMAGING | Age: 56
Discharge: HOME | End: 2021-06-11
Payer: COMMERCIAL

## 2021-06-09 DIAGNOSIS — G25.2 COARSE TREMORS: ICD-10-CM

## 2021-06-09 DIAGNOSIS — R90.89 ABNORMAL CT OF BRAIN: ICD-10-CM

## 2021-06-09 DIAGNOSIS — E78.5 HYPERLIPIDEMIA, UNSPECIFIED HYPERLIPIDEMIA TYPE: ICD-10-CM

## 2021-06-09 DIAGNOSIS — Z13.29 SCREENING FOR THYROID DISORDER: ICD-10-CM

## 2021-06-09 DIAGNOSIS — G47.00 INSOMNIA, UNSPECIFIED TYPE: ICD-10-CM

## 2021-06-09 DIAGNOSIS — I10 ESSENTIAL HYPERTENSION: ICD-10-CM

## 2021-06-09 DIAGNOSIS — E11.8 TYPE 2 DIABETES MELLITUS WITH COMPLICATION (HCC): ICD-10-CM

## 2021-06-09 LAB
ABSOLUTE EOS #: 0.23 K/UL (ref 0–0.44)
ABSOLUTE IMMATURE GRANULOCYTE: <0.03 K/UL (ref 0–0.3)
ABSOLUTE LYMPH #: 1.52 K/UL (ref 1.1–3.7)
ABSOLUTE MONO #: 0.52 K/UL (ref 0.1–1.2)
ALBUMIN SERPL-MCNC: 4.2 G/DL (ref 3.5–5.2)
ALBUMIN/GLOBULIN RATIO: 1.3 (ref 1–2.5)
ALP BLD-CCNC: 108 U/L (ref 40–129)
ALT SERPL-CCNC: 36 U/L (ref 5–41)
ANION GAP SERPL CALCULATED.3IONS-SCNC: 10 MMOL/L (ref 9–17)
AST SERPL-CCNC: 20 U/L
BASOPHILS # BLD: 1 % (ref 0–2)
BASOPHILS ABSOLUTE: 0.03 K/UL (ref 0–0.2)
BILIRUB SERPL-MCNC: 0.38 MG/DL (ref 0.3–1.2)
BUN BLDV-MCNC: 19 MG/DL (ref 6–20)
BUN/CREAT BLD: 19 (ref 9–20)
CALCIUM SERPL-MCNC: 9.9 MG/DL (ref 8.6–10.4)
CHLORIDE BLD-SCNC: 102 MMOL/L (ref 98–107)
CHOLESTEROL/HDL RATIO: 5.5
CHOLESTEROL: 259 MG/DL
CO2: 27 MMOL/L (ref 20–31)
CREAT SERPL-MCNC: 0.99 MG/DL (ref 0.7–1.2)
DIFFERENTIAL TYPE: ABNORMAL
EOSINOPHILS RELATIVE PERCENT: 5 % (ref 1–4)
FOLATE: 13 NG/ML
GFR AFRICAN AMERICAN: >60 ML/MIN
GFR NON-AFRICAN AMERICAN: >60 ML/MIN
GFR SERPL CREATININE-BSD FRML MDRD: ABNORMAL ML/MIN/{1.73_M2}
GFR SERPL CREATININE-BSD FRML MDRD: ABNORMAL ML/MIN/{1.73_M2}
GLUCOSE BLD-MCNC: 118 MG/DL (ref 70–99)
HCT VFR BLD CALC: 44.3 % (ref 40.7–50.3)
HDLC SERPL-MCNC: 47 MG/DL
HEMOGLOBIN: 14.3 G/DL (ref 13–17)
IMMATURE GRANULOCYTES: 0 %
LDL CHOLESTEROL: 167 MG/DL (ref 0–130)
LYMPHOCYTES # BLD: 35 % (ref 24–43)
MCH RBC QN AUTO: 29.7 PG (ref 25.2–33.5)
MCHC RBC AUTO-ENTMCNC: 32.3 G/DL (ref 28.4–34.8)
MCV RBC AUTO: 92.1 FL (ref 82.6–102.9)
MONOCYTES # BLD: 12 % (ref 3–12)
NRBC AUTOMATED: 0 PER 100 WBC
PDW BLD-RTO: 13.1 % (ref 11.8–14.4)
PLATELET # BLD: 253 K/UL (ref 138–453)
PLATELET ESTIMATE: ABNORMAL
PMV BLD AUTO: 11 FL (ref 8.1–13.5)
POTASSIUM SERPL-SCNC: 4.5 MMOL/L (ref 3.7–5.3)
RBC # BLD: 4.81 M/UL (ref 4.21–5.77)
RBC # BLD: ABNORMAL 10*6/UL
SEG NEUTROPHILS: 47 % (ref 36–65)
SEGMENTED NEUTROPHILS ABSOLUTE COUNT: 2.07 K/UL (ref 1.5–8.1)
SODIUM BLD-SCNC: 139 MMOL/L (ref 135–144)
TOTAL PROTEIN: 7.4 G/DL (ref 6.4–8.3)
TRIGL SERPL-MCNC: 225 MG/DL
TSH SERPL DL<=0.05 MIU/L-ACNC: 2.22 MIU/L (ref 0.3–5)
VITAMIN B-12: 487 PG/ML (ref 232–1245)
VLDLC SERPL CALC-MCNC: ABNORMAL MG/DL (ref 1–30)
WBC # BLD: 4.4 K/UL (ref 3.5–11.3)
WBC # BLD: ABNORMAL 10*3/UL

## 2021-06-09 PROCEDURE — 85025 COMPLETE CBC W/AUTO DIFF WBC: CPT

## 2021-06-09 PROCEDURE — 82746 ASSAY OF FOLIC ACID SERUM: CPT

## 2021-06-09 PROCEDURE — 82607 VITAMIN B-12: CPT

## 2021-06-09 PROCEDURE — 36415 COLL VENOUS BLD VENIPUNCTURE: CPT

## 2021-06-09 PROCEDURE — 70551 MRI BRAIN STEM W/O DYE: CPT

## 2021-06-09 PROCEDURE — 80053 COMPREHEN METABOLIC PANEL: CPT

## 2021-06-09 PROCEDURE — 84443 ASSAY THYROID STIM HORMONE: CPT

## 2021-06-09 PROCEDURE — 80061 LIPID PANEL: CPT

## 2021-06-18 ENCOUNTER — OFFICE VISIT (OUTPATIENT)
Dept: PSYCHOLOGY | Age: 56
End: 2021-06-18
Payer: COMMERCIAL

## 2021-06-18 DIAGNOSIS — F31.61 BIPOLAR DISORDER, CURRENT EPISODE MIXED, MILD (HCC): Primary | ICD-10-CM

## 2021-06-18 PROCEDURE — 90791 PSYCH DIAGNOSTIC EVALUATION: CPT | Performed by: SOCIAL WORKER

## 2021-06-18 NOTE — PROGRESS NOTES
Behavior was Within Normal Limits with No observation or self-report of difficulties ambulating. Attitude was Engageable and Help-Seeking. Eye-contact was good. Speech: rate - WNL, rhythm -  WNL, volume - WNL  Verbalizations were goal directed. Thought processes were intact and goal-oriented without evidence of delusions, hallucinations, obsessions, or geoff; with little cognitive distortions. Associations were characterized by intact cognitive processes. Pt was oriented to person, place, time, and general circumstances;  recent:  good. Insight and judgment were estimated to be fair, AEB, a fair  understanding of cyclical maladaptive patterns, and the ability to use insight to inform behavior change.        CURRENT MEDICATIONS    Current Outpatient Medications:     albuterol sulfate HFA (PROAIR HFA) 108 (90 Base) MCG/ACT inhaler, INHALE 2 PUFFS INTO THE LUNGS TWICE DAILY AS NEEDED FOR WHEEZING, Disp: 8.5 g, Rfl: 3    ARIPiprazole (ABILIFY) 10 MG tablet, TAKE 1 TABLET BY MOUTH EVERY DAY, Disp: , Rfl:     fluticasone (FLONASE) 50 MCG/ACT nasal spray, 1 spray by Each Nostril route as needed for Rhinitis, Disp: , Rfl:     Cetirizine HCl (ZYRTEC ALLERGY PO), Take 1 tablet by mouth daily, Disp: , Rfl:     atorvastatin (LIPITOR) 40 MG tablet, TAKE 1 TABLET BY MOUTH DAILY, Disp: 30 tablet, Rfl: 0    aspirin 81 MG chewable tablet, CHEW AND SWALLOW 1 TABLET BY MOUTH DAILY, Disp: 30 tablet, Rfl: 0    Lancets MISC, 1 each by Does not apply route daily, Disp: 100 each, Rfl: 11    budesonide-formoterol (SYMBICORT) 160-4.5 MCG/ACT AERO, Inhale 2 puffs into the lungs 2 times daily, Disp: 3 Inhaler, Rfl: 1    metFORMIN (GLUCOPHAGE) 500 MG tablet, TAKE 1 TABLET BY MOUTH TWICE DAILY WITH MEALS, Disp: 60 tablet, Rfl: 11    ONETOUCH ULTRA strip, USE TO TEST TWICE DAILY AS DIRECTED, Disp: 200 strip, Rfl: 11    lisinopril (PRINIVIL;ZESTRIL) 10 MG tablet, TAKE 1 TABLET BY MOUTH DAILY, Disp: 30 tablet, Rfl: 11   tiotropium (Galina Collie) 18 MCG inhalation capsule, Inhale 1 capsule into the lungs daily, Disp: 90 capsule, Rfl: 1    glucose monitoring kit (FREESTYLE) monitoring kit, 1 kit by Does not apply route daily, Disp: 1 kit, Rfl: 0     FAMILY MEDICAL/MH HISTORY   His family history includes Diabetes in his mother; Heart Disease in his mother; Kidney Disease (age of onset: 21) in his paternal cousin; No Known Problems in his father; Other in his mother. He was adopted. [address] reported he has history of 2 suicide attempts - he was hospitalized, tried to overdose. He shared that one of the attempts was triggered by a partner having an . The other attempt was related to feeling he was not loved by biological or adoptive family at the time. He has participated in counseling in the past and recently was started on Abilify and Hydroxyzine. PSYCHOSOCIAL HISTORY  Current living situation: Pt reported he and girlfriend reside together. He has a 8year-old son that often visits. Pt reported he has 11 children. Work/Education: Pt reported he is working full-time at [employer]. Support system: Pt reported he has healthy relationships with children and grandchildren     Advent/Spirituality: Pt reported \"I believe in the Lord\" He identifies as Quaker. DRUG AND ALCOHOL CURRENT USE/HISTORY  TOBACCO:  He reports that he quit smoking about 10 years ago. His smoking use included cigarettes. He smoked 1.50 packs per day. He has never used smokeless tobacco.  ALCOHOL:  He reports current alcohol use. OTHER SUBSTANCES: He reports no history of drug use. ASSESSMENT  Grace Fenton presented to the appointment today for evaluation and treatment of symptoms of depression and anxiety. He is currently deemed no risk to himself or others and meets criteria for Bipolar Disorder, mixed episode.  He will benefit from ongoing medication evaluation to assess if medications are helpful in treating depressive and anxious symptoms. Marshall's depressive and anxious symptoms are well controlled at this time. He will also benefit from brief and solution-focused consultation to address cognitive and behavioral interventions for depressive and anxious symptoms. We also discussed Travis Mullen likely benefiting from establishing with an ongoing therapy provider once completing initial sessions with [facility]. Shama Escalante was in agreement with recommendations. PHQ Scores 6/7/2021 4/22/2021 1/7/2021 5/8/2020 2/5/2020 2/6/2019 6/26/2018   PHQ2 Score 4 4 0 0 0 0 0   PHQ9 Score 15 14 0 0 0 0 0     Interpretation of Total Score Depression Severity: 1-4 = Minimal depression, 5-9 = Mild depression, 10-14 = Moderate depression, 15-19 = Moderately severe depression, 20-27 = Severe depression    How often pt has had thoughts of death or hurting self (if PHQ positive for depression):       No flowsheet data found. Interpretation of LOUIE-7 score: 5-9 = mild anxiety, 10-14 = moderate anxiety, 15+ = severe anxiety. Recommend referral to behavioral health for scores 10 or greater. DIAGNOSIS  Shama Escalante was seen today for depression and anxiety. Diagnoses and all orders for this visit:    Bipolar disorder, current episode mixed, mild ([address]        INTERVENTION  Discussed prevalence of  depression and anxiety for general population, Discussed potential treatments for  depression and anxiety, Established rapport, [place]-setting to identify pt's primary goals for [facility] visit / overall health, Supportive techniques and Safety planning re: call 911 or go to nearest ER if SI returns. Miah Son was recommended to establish with [facility] to begin psychotherapy. Discussed short-term/solution focused model and likely benefits of patient eventually establishing with ongoing provider. Travis Mullen was scheduled for f/u on 7/9 to begin addressing depressive symptoms. INTERACTIVE COMPLEXITY  Is interactive complexity present?

## 2021-07-09 ENCOUNTER — OFFICE VISIT (OUTPATIENT)
Dept: PSYCHOLOGY | Age: 56
End: 2021-07-09
Payer: COMMERCIAL

## 2021-07-09 DIAGNOSIS — F31.61 BIPOLAR DISORDER, CURRENT EPISODE MIXED, MILD (HCC): Primary | ICD-10-CM

## 2021-07-09 PROCEDURE — 90834 PSYTX W PT 45 MINUTES: CPT | Performed by: SOCIAL WORKER

## 2021-07-22 ENCOUNTER — OFFICE VISIT (OUTPATIENT)
Dept: NEUROLOGY | Age: 56
End: 2021-07-22

## 2021-07-22 VITALS
WEIGHT: 204 LBS | DIASTOLIC BLOOD PRESSURE: 70 MMHG | SYSTOLIC BLOOD PRESSURE: 133 MMHG | HEART RATE: 70 BPM | TEMPERATURE: 97.2 F | BODY MASS INDEX: 25.5 KG/M2

## 2021-07-22 DIAGNOSIS — G43.019 INTRACTABLE MIGRAINE WITHOUT AURA AND WITHOUT STATUS MIGRAINOSUS: ICD-10-CM

## 2021-07-22 DIAGNOSIS — R93.89 ABNORMAL MRI: Primary | ICD-10-CM

## 2021-07-22 DIAGNOSIS — R25.1 TREMOR: ICD-10-CM

## 2021-07-22 PROCEDURE — 99204 OFFICE O/P NEW MOD 45 MIN: CPT | Performed by: NURSE PRACTITIONER

## 2021-07-22 RX ORDER — TOPIRAMATE 25 MG/1
25 TABLET ORAL NIGHTLY
Qty: 30 TABLET | Refills: 3 | Status: SHIPPED | OUTPATIENT
Start: 2021-07-22 | End: 2021-10-07 | Stop reason: SDUPTHER

## 2021-07-22 RX ORDER — RIZATRIPTAN BENZOATE 10 MG/1
10 TABLET ORAL
Qty: 12 TABLET | Refills: 5 | Status: SHIPPED | OUTPATIENT
Start: 2021-07-22 | End: 2021-10-07 | Stop reason: ALTCHOICE

## 2021-07-22 NOTE — PROGRESS NOTES
[facility]            [address]          [place], [address] [facility]          Dept: [phone]          Dept Fax: [phone]        MD Pedro Curtis MD Delpha Round, MD Linnie Beth, FARIBA         New Patient Consultation    7/22/2021    HISTORY OF PRESENT ILLNESS:       I had the pleasure of seeing Gauri Chavarria who presents to establish neurologic care. The patient presents for evaluation of coarse tremors and an abnormal MRI of the brain. The patient did have an MRI of his brain without contrast on June 9, 2021 which did show scattered foci of T2 FLAIR hyperintensity within the supratentorial white matter which is concerning for a demyelinating disease. He has not had any visual loss, pain of eye or blurred vision. He notes that he does have tingling in his bilateral hands as well as his bilateral legs. The patient was a previous every day smoker for 25 years but did quit approximately 10 years ago. The patient does have a history of diabetes, high blood pressure and elevated cholesterol. He has previously had numbness of one side. The patient reports that he has had tremors for several years. He notes that when he wakes up his entire body will shake. This does happen intermittently throughout the day and has no triggers. He was recently prescribed Atarax 25 mg 4 times daily which is almost eliminated the tremors. He is prescribed aspirin 81 mg and lipitor 40 mg due to a previous heart attack. He does not feel rested upon awakening and notes that he does not really sleep. He has tried trazodone and ambien in the past which were not effective. He has not completed a baseline diagnostic sleep study previously. His girlfriend reports that he does have a tremor that is present while he is asleep.       The patient reports today that he gets a migraine 3 days/week that are pounding and throbbing in nature. He notes that these are associated with nausea, photophobia and phonophobia. He notes that he has had these for several years and they are holoacranial. The migraines can last for several days in a row and he has approximately 10-15 headache days/month. Headache location: holoacranila  Headache quality: pounding, throbbing  Associated factors:  nausea , Photophobia, Phonophobia  Intensity: 10/10  Headache chronicity: several years  Headache frequency:  3 headaches/week that can last for several days  Aggravating factors : bright lights, loud sounds  Relieving factors: lying down in a dark room, tylenol 650 mg  Prophylactic medications: none  Abortive medications: tylenol 650 mg  Previously used medications: none        Testing reviewed:    MRI Brain WO Contrast 6/9/2021  Impression   1. No acute intracranial abnormality.  No acute infarct. 2.  Scattered foci of T2 FLAIR hyperintensity are seen within the   supratentorial white matter, which are nonspecific. Diagnostic considerations   include early chronic microvascular ischemic changes, sequelae of chronic   migraines, demyelinating lesions or perhaps vasculitis. CT Head WO Contrast 5/11/2021  Impression   No acute intracranial abnormality.  MRI may be obtained if clinically   indicated.                PAST MEDICAL HISTORY:         Diagnosis Date    Asthma     GERD (gastroesophageal reflux disease)     Heart attack ([facility] [address] 2003    Hyperlipidemia     Hypertension     Type 2 diabetes mellitus (HCC)         PAST SURGICAL HISTORY:         Procedure Laterality Date    APPENDECTOMY      FINGER AMPUTATION Right     5th finger    HAND SURGERY Left     KNEE SURGERY          SOCIAL HISTORY:     Social History     Socioeconomic History    Marital status:      Spouse name: Not on file    Number of children: Not on file    Years of education: Not on file    Highest education level: Not on file   Occupational History    Not on  Cetirizine HCl (ZYRTEC ALLERGY PO) Take 1 tablet by mouth daily      atorvastatin (LIPITOR) 40 MG tablet TAKE 1 TABLET BY MOUTH DAILY 30 tablet 0    aspirin 81 MG chewable tablet CHEW AND SWALLOW 1 TABLET BY MOUTH DAILY 30 tablet 0    Lancets MISC 1 each by Does not apply route daily 100 each 11    budesonide-formoterol (SYMBICORT) 160-4.5 MCG/ACT AERO Inhale 2 puffs into the lungs 2 times daily 3 Inhaler 1    metFORMIN (GLUCOPHAGE) 500 MG tablet TAKE 1 TABLET BY MOUTH TWICE DAILY WITH MEALS 60 tablet 11    ONETOUCH ULTRA strip USE TO TEST TWICE DAILY AS DIRECTED 200 strip 11    lisinopril (PRINIVIL;ZESTRIL) 10 MG tablet TAKE 1 TABLET BY MOUTH DAILY 30 tablet 11    glucose monitoring kit (FREESTYLE) monitoring kit 1 kit by Does not apply route daily 1 kit 0    tiotropium (SPIRIVA HANDIHALER) 18 MCG inhalation capsule Inhale 1 capsule into the lungs daily (Patient not taking: Reported on 7/22/2021) 90 capsule 1     No current facility-administered medications for this visit. ALLERGIES:     Allergies   Allergen Reactions    Morphine Other (See Comments)     Jaw locks up, shakes  Lock jaw    Oxycodone-Acetaminophen     Codeine Nausea And Vomiting                          All items selected indicate a positive finding. Those items not selected are negative.   Constitutional [] Weight loss/gain   [] Fatigue  [] Fever/Chills   HEENT [] Hearing Loss  [] Visual Disturbance  [] Tinnitus  [] Eye pain   Respiratory [] Shortness of Breath  [] Cough  [] Snoring   Cardiovascular [] Chest Pain  [] Palpitations  [] Lightheaded   GI [] Constipation  [] Diarrhea  [] Swallowing change  [x] Nausea/vomiting    [] Urinary Frequency  [] Urinary Urgency   Musculoskeletal [] Neck pain  [] Back pain  [] Muscle pain  [] Restless legs   Dermatologic [] Skin changes   Neurologic [] Memory loss/confusion  [] Seizures  [] Trouble walking or imbalance  [] Dizziness  [x] Sleep disturbance  [] Weakness  [] Numbness  [x] Tremors  [] Speech Difficulty   [x] Headaches  [x] Light Sensitivity  [x] Sound Sensitivity   Endocrinology []Excessive thirst  []Excessive hunger   Psychiatric [] Anxiety/Depression  [] Hallucination   Allergy/immunology []Hives/environmental allergies   Hematologic/lymph [] Abnormal bleeding  [] Abnormal bruising                    PHYSICAL EXAMINATION:       Vitals:    07/22/21 1335   BP: 133/70   Pulse: 70   Temp: 97.2 °F (36.2 °C)                                              .                                                                                                     General Appearance:  Alert, cooperative, no signs of distress, appears stated age   Head:  Normocephalic, no signs of trauma   Eyes:  Conjunctiva/corneas clear;  eyelids intact   Ears:  Normal external ear and canals   Nose: Nares normal, mucosa normal, no drainage    Throat: Lips and tongue normal; teeth normal;  gums normal   Neck: Supple, intact flexion, extension and rotation;   trachea midline;  no adenopathy;   thyroid: not enlarged;   no carotid pulse abnormality   Back:   Symmetric, no curvature, ROM adequate   Lungs:   Respirations unlabored   Heart:  Regular rate and rhythm           Extremities: Extremities normal, no cyanosis, no edema   Pulses: Symmetric over head and neck   Skin: Skin color, texture normal, no rashes, no lesions                                     NEUROLOGIC EXAMINATION    Neurologic Exam    Mental status    Alert and oriented x 3; intact memory with no confusion, speech or language problems; no hallucinations or delusions  Fund of information appropriate for level of education    Cranial nerves    II - visual fields intact to confrontation  III, IV, VI  extra-ocular muscles full: no pupillary defect; no BUCKY, no nystagmus, no ptosis   V - normal facial sensation                                                               VII - normal facial symmetry VIII - intact hearing                                                                             IX, X - symmetrical palate                                                                  XI - symmetrical shoulder shrug                                                       XII - tongue midline without atrophy or fasciculation      Motor function  Normal muscle bulk and tone; strength 5/5 on all 4 extremities, no pronator drift      Sensory function Intact to light touch, pinprick, vibration, proprioception on all 4 extremities      Cerebellar Intact fine motor movement. No involuntary movements or tremors. No ataxia or dysmetria on finger to nose or heel to shin testing      Reflex function DTR 2+ on bilateral UE and LE, symmetric. Negative Babinski, negative machuca, no clonus      Gait                   normal base and arm swing              Medical Decision Making: Thank you very much for the kind referral of Jolie Ramirez. I look forward to working with you in the neurological care of your patient. 1. Previous abnormal MRI which did show multiple periventricular white matter spots which is concerning for a possible demyelinative disease. The patient does have tingling in his bilateral hands and bilateral legs and has previously had episodes of numbness on one side. Patient does have risk factors for small vessel disease, however the distribution of the white matter changes is less consistent with small vessel disease  1. Lumbar puncture to check for the presence for oligoclonal bands  2. Migraine Headaches, currently getting 3 headache days/week  1. Start topamax 50 mg once daily  2. Start maxalt 10 mg for abortive   3. Episodes of generalized tremor that occurs throughout his entire body and while he sleeps. The patient also notes that he does not feel rested. He notes that he barely sleeps during the night. This has been a problem for most of his life.   1. Baseline diagnostic sleep study  2. Return for follow up visit in 2 months    Signed: Laz Beckwith CNP  Please note that this chart was generated using voice recognition Dragon dictation software. Although every effort was made to ensure the accuracy of this automated transcription, some errors in transcription may have occurred. Provider Attestation: The documentation recorded by the scribe accurately reflects the service I personally performed and the decisions made by myself. Portions of this note were transcribed by a scribe. I personally performed the history, physical exam, and medical decision-making and confirm the accuracy of the information in the transcribed note. Scribe Attestation:   By signing my name below, Joaquim Paulino, attest that this documentation has been prepared under the direction and in the presence of Laz Beckwith CNP.

## 2021-07-23 ENCOUNTER — TELEPHONE (OUTPATIENT)
Dept: INTERVENTIONAL RADIOLOGY/VASCULAR | Age: 56
End: 2021-07-23

## 2021-07-29 NOTE — PROGRESS NOTES
ADULT BEHAVIORAL HEALTH FOLLOW UP  Loyjocelyn Dior LASHAUN  Licensed Independent          Visit Date: 7/9/2021   Time of appointment: 11:12 AM   Time spent with Patient: 45 minutes. This is patient's second appointment. Reason for Consult:  Depression     PCP:  Marya Moncada MD      Pt provided informed consent for the behavioral health program. Discussed with patient model of service to include the limits of confidentiality (i.e. abuse reporting, suicide intervention, etc.) and short-term intervention focused approach. Pt indicated understanding. Es Hoang is a 48 y.o. male who presents for follow up of depression. Ophelia Chun reported he has been feeling very overwhelmed. He reports continued relationship stress and finances as recent triggers. Ophelia Chun explained that his employer has been cutting their hours, which has led to Ophelia Chun and his partner to seek a second job that he is soon to start working in [employer]. Ophelia Chun expressed things had improved with his partner and shortly began discussing information that was conflicting with how he was actually feeling. He soon admitted that he still has significant concerns and trust issues with his girlfriend due to interactions she has had with other men at work and in her personal life. Ophelia Chun expressed concern that she may leave him one day and he is afraid how this may impact him. He shared wanting this relationship to work and being comfortable with staying by her side as she worked through some of her behavioral issues that he is unhappy about relating to other men. Ophelia Chun shared he feels their issues contribute quite a bit to his depressive symptoms due to fear of the relationship failing. Previous Recommendations: Ophelia Chun was recommended to establish with [facility] to begin psychotherapy. Discussed short-term/solution-focused model and likely benefits of patient eventually establishing with ongoing provider.  Ophelia Chun was scheduled for f/u on 7/9 to begin addressing depressive symptoms. MENTAL STATUS EXAM  Mood was dysthymic with flat affect. Suicidal ideation was denied. Homicidal ideation was denied. Hygiene was good . Dress was appropriate. Behavior was Within Normal Limits with No observation or self-report of difficulties ambulating. Attitude was Engageable. Eye-contact was fair. Speech: rate - WNL, rhythm - WNL, volume - WNL. Verbalizations were coherent. Thought processes were intact and goal-oriented without evidence of delusions, hallucinations, obsessions, or geoff; with little cognitive distortions. Associations were characterized by intact cognitive processes. Pt was oriented to person, place, time, and general circumstances;  recent:  good. Insight and judgment were estimated to be fair, AEB, a fair  understanding of cyclical maladaptive patterns, and the ability to use insight to inform behavior change. ASSESSMENT  Epi Shafer presented to the appointment today for evaluation and treatment of symptoms of depression. He is currently deemed no risk to himself or others and meets criteria for Bipolar Disorder, current episode mixed. [facility] recommended couples counseling and the potential benefits of meeting with a therapist locally for him and partner to work on improving their relationship. Jaylon Kam reported he intended to contact behavioral health provider he has been seeing to provide possible counseling referral that is closer to where he resides for continued psychotherapy. Jaylon Kam was in agreement with recommendations.        PHQ Scores 6/7/2021 4/22/2021 1/7/2021 5/8/2020 2/5/2020 2/6/2019 6/26/2018   PHQ2 Score 4 4 0 0 0 0 0   PHQ9 Score 15 14 0 0 0 0 0     Interpretation of Total Score Depression Severity: 1-4 = Minimal depression, 5-9 = Mild depression, 10-14 = Moderate depression, 15-19 = Moderately severe depression, 20-27 = Severe depression    How often pt has had thoughts of death or hurting self (if PHQ positive for depression):       No flowsheet data found. Interpretation of LOUIE-7 score: 5-9 = mild anxiety, 10-14 = moderate anxiety, 15+ = severe anxiety. Recommend referral to behavioral health for scores 10 or greater. DIAGNOSIS  Price Bateman was seen today for depression. Diagnoses and all orders for this visit:    Bipolar disorder, current episode mixed, mild [facility]        INTERVENTION  Discussed various factors related to the development and maintenance of  depression (including biological, cognitive, behavioral, and environmental factors), Trained in strategies for increasing balanced thinking, Supportive techniques and CBT to target depressive thinking patterns, challenge negative automatic thoughts. PLAN  F/u appointment scheduled for 8/6 to check-in on patient's progress in coping with and challenging depressive thoughts. Discuss plan for continued treatment to ensure patient has information for referrals as needed. INTERACTIVE COMPLEXITY  Is interactive complexity present?   No  Reason:  N/A  Additional Supporting Information:  N/A       Electronically signed by Araceli Starr on 7/28/2021 at 10:06 PM

## 2021-08-05 ENCOUNTER — TELEPHONE (OUTPATIENT)
Dept: NEUROLOGY | Age: 56
End: 2021-08-05

## 2021-08-05 DIAGNOSIS — R25.1 TREMOR: ICD-10-CM

## 2021-08-05 DIAGNOSIS — G47.30 SLEEP APNEA, UNSPECIFIED TYPE: ICD-10-CM

## 2021-08-05 DIAGNOSIS — R93.89 ABNORMAL MRI: Primary | ICD-10-CM

## 2021-08-05 NOTE — TELEPHONE ENCOUNTER
Received a fax from [facility]. They are asking for an additional dx be added to the sleep study, only abnormal MRI was on the order. New order was placed.

## 2021-08-06 ENCOUNTER — OFFICE VISIT (OUTPATIENT)
Dept: PSYCHOLOGY | Age: 56
End: 2021-08-06
Payer: COMMERCIAL

## 2021-08-06 DIAGNOSIS — F31.61 BIPOLAR DISORDER, CURRENT EPISODE MIXED, MILD (HCC): Primary | ICD-10-CM

## 2021-08-06 PROCEDURE — 90834 PSYTX W PT 45 MINUTES: CPT | Performed by: SOCIAL WORKER
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
## 2021-08-13 DIAGNOSIS — I10 ESSENTIAL HYPERTENSION: ICD-10-CM

## 2021-08-13 RX ORDER — LISINOPRIL 10 MG/1
10 TABLET ORAL DAILY
Qty: 30 TABLET | Refills: 11 | Status: SHIPPED | OUTPATIENT
Start: 2021-08-13

## 2021-08-13 NOTE — TELEPHONE ENCOUNTER
Medication: Lisinopril   Last visit: 5/21/2021  Next visit: 9/1/2021  Last refill: 7/14/20  Pharmacy: [facility] / Kayl[facility] Anguiano

## 2021-08-16 ENCOUNTER — HOSPITAL ENCOUNTER (OUTPATIENT)
Dept: INTERVENTIONAL RADIOLOGY/VASCULAR | Age: 56
Discharge: HOME | End: 2021-08-18
Payer: COMMERCIAL

## 2021-08-16 VITALS
OXYGEN SATURATION: 99 % | HEART RATE: 62 BPM | RESPIRATION RATE: 16 BRPM | DIASTOLIC BLOOD PRESSURE: 77 MMHG | BODY MASS INDEX: 25.36 KG/M2 | TEMPERATURE: 96.8 F | HEIGHT: 75 IN | SYSTOLIC BLOOD PRESSURE: 116 MMHG | WEIGHT: 204 LBS

## 2021-08-16 DIAGNOSIS — R93.89 ABNORMAL MRI: ICD-10-CM

## 2021-08-16 LAB
ALBUMIN CSF: 176 MG/L (ref 70–350)
ALBUMIN INDEX: 41.9
ALBUMIN SERPL-MCNC: 4.2 G/DL (ref 3.5–5.2)
APPEARANCE CSF: CLEAR
GLUCOSE, CSF: 66 MG/DL (ref 40–70)
IGG CSF: 2.1 MG/DL (ref 0.5–7)
IGG INDEX CSF: 0.53
IGG SYNTHESIS RATE CSF: < 3.3 MG/24 H
IGG: 941 MG/DL (ref 700–1600)
INR BLD: 0.9
OLIGOCLONAL BANDS: ABNORMAL
PARTIAL THROMBOPLASTIN TIME: 27.1 SEC (ref 24–36)
PLATELET # BLD: 206 K/UL (ref 150–450)
PROTEIN CSF: 32.6 MG/DL (ref 15–45)
PROTHROMBIN TIME: 11.9 SEC (ref 11.8–14.6)
RBC CSF: 0 /MM3
SUPERNAT COLOR CSF: COLORLESS
TUBE NUMBER CSF: NORMAL
VOLUME CSF: NORMAL
WBC CSF: 1 /MM3
XANTHOCHROMIA: NORMAL

## 2021-08-16 PROCEDURE — 86788 WEST NILE VIRUS AB IGM: CPT

## 2021-08-16 PROCEDURE — 82945 GLUCOSE OTHER FLUID: CPT

## 2021-08-16 PROCEDURE — 86618 LYME DISEASE ANTIBODY: CPT

## 2021-08-16 PROCEDURE — 84157 ASSAY OF PROTEIN OTHER: CPT

## 2021-08-16 PROCEDURE — 7100000030 HC ASPR PHASE II RECOVERY - FIRST 15 MIN

## 2021-08-16 PROCEDURE — 82042 OTHER SOURCE ALBUMIN QUAN EA: CPT

## 2021-08-16 PROCEDURE — 86789 WEST NILE VIRUS ANTIBODY: CPT

## 2021-08-16 PROCEDURE — 83873 ASSAY OF CSF PROTEIN: CPT

## 2021-08-16 PROCEDURE — 85610 PROTHROMBIN TIME: CPT

## 2021-08-16 PROCEDURE — 86592 SYPHILIS TEST NON-TREP QUAL: CPT

## 2021-08-16 PROCEDURE — 82040 ASSAY OF SERUM ALBUMIN: CPT

## 2021-08-16 PROCEDURE — 85730 THROMBOPLASTIN TIME PARTIAL: CPT

## 2021-08-16 PROCEDURE — 36415 COLL VENOUS BLD VENIPUNCTURE: CPT

## 2021-08-16 PROCEDURE — 85049 AUTOMATED PLATELET COUNT: CPT

## 2021-08-16 PROCEDURE — 87205 SMEAR GRAM STAIN: CPT

## 2021-08-16 PROCEDURE — 2709999900 IR LUMBAR PUNCTURE FOR DIAGNOSIS

## 2021-08-16 PROCEDURE — 62328 DX LMBR SPI PNXR W/FLUOR/CT: CPT

## 2021-08-16 PROCEDURE — 87070 CULTURE OTHR SPECIMN AEROBIC: CPT

## 2021-08-16 PROCEDURE — 82784 ASSAY IGA/IGD/IGG/IGM EACH: CPT

## 2021-08-16 PROCEDURE — 7100000031 HC ASPR PHASE II RECOVERY - ADDTL 15 MIN

## 2021-08-16 PROCEDURE — 89050 BODY FLUID CELL COUNT: CPT

## 2021-08-16 PROCEDURE — 83916 OLIGOCLONAL BANDS: CPT

## 2021-08-16 ASSESSMENT — PAIN DESCRIPTION - LOCATION: LOCATION: HEAD

## 2021-08-16 ASSESSMENT — PAIN SCALES - GENERAL: PAINLEVEL_OUTOF10: 1

## 2021-08-16 ASSESSMENT — PAIN DESCRIPTION - DESCRIPTORS
DESCRIPTORS: ACHING
DESCRIPTORS: ACHING

## 2021-08-16 ASSESSMENT — PAIN DESCRIPTION - PAIN TYPE: TYPE: ACUTE PAIN

## 2021-08-16 ASSESSMENT — PAIN - FUNCTIONAL ASSESSMENT: PAIN_FUNCTIONAL_ASSESSMENT: 0-10

## 2021-08-16 NOTE — BRIEF OP NOTE
Brief Postoperative Note    Gauri Chavarria  YOB: 1965  [id number]    Pre-operative Diagnosis: Concerns for MS    Post-operative Diagnosis: Same    Procedure: LP    Anesthesia: Local    Surgeons/Assistants: BENJAMIN Wheeler    Estimated Blood Loss: less than 50     Complications: None    Specimens: Was Obtained: 12ml clear CSF    Findings: Successful LP. Follow up with Neurology.     Electronically signed by Rick Bridges MD on 8/16/2021 at 1:00 PM

## 2021-08-16 NOTE — H&P
HISTORY and [id number]       NAME:  Alma Zimmer  MRN: [id number]   YOB: 1965   Date: 8/16/2021   Age: 48 y.o. Gender: male       COMPLAINT AND PRESENT HISTORY:     Alma Zimmer is 48 y.o.,  male, here for IR LUMBAR PUNCTURE FOR DIAGNOSIS. Patient had an abnormal MRI done on 6/9/2021 that pertained information concerning demyelinating disease. Patient has history of tremors for several years and complaints of tingling in her bilateral hands and legs. Patient has been seeing neurologist at neurological [facility]. Please see consultation note below. I had the pleasure of seeing Alma Zimmer who presents to establish neurologic care. The patient presents for evaluation of coarse tremors and an abnormal MRI of the brain.     The patient did have an MRI of his brain without contrast on June 9, 2021 which did show scattered foci of T2 FLAIR hyperintensity within the supratentorial white matter which is concerning for a demyelinating disease. He has not had any visual loss, pain of eye or blurred vision. He notes that he does have tingling in his bilateral hands as well as his bilateral legs. The patient was a previous every day smoker for 25 years but did quit approximately 10 years ago. The patient does have a history of diabetes, high blood pressure and elevated cholesterol. He has previously had numbness of one side. The patient reports that he has had tremors for several years. He notes that when he wakes up his entire body will shake. This does happen intermittently throughout the day and has no triggers. He was recently prescribed Atarax 25 mg 4 times daily which is almost eliminated the tremors. He is prescribed aspirin 81 mg and lipitor 40 mg due to a previous heart attack. He does not feel rested upon awakening and notes that he does not really sleep. He has tried trazodone and ambien in the past which were not effective.  He has not completed a baseline diagnostic sleep study previously. His girlfriend reports that he does have a tremor that is present while he is asleep.       The patient reports today that he gets a migraine 3 days/week that are pounding and throbbing in nature. He notes that these are associated with nausea, photophobia and phonophobia. He notes that he has had these for several years and they are holoacranial. The migraines can last for several days in a row and he has approximately 10-15 headache days/month. Headache location: holoacranila  Headache quality: pounding, throbbing  Associated factors:  nausea , Photophobia, Phonophobia  Intensity: 10/10  Headache chronicity: several years  Headache frequency:  3 headaches/week that can last for several days  Aggravating factors : bright lights, loud sounds  Relieving factors: lying down in a dark room, tylenol 650 mg  Prophylactic medications: none  Abortive medications: tylenol 650 mg  Previously used medications: none      Testing reviewed:     MRI Brain WO Contrast 6/9/2021  Impression   1. No acute intracranial abnormality.  No acute infarct. 2.  Scattered foci of T2 FLAIR hyperintensity are seen within the   supratentorial white matter, which are nonspecific. Diagnostic considerations   include early chronic microvascular ischemic changes, sequelae of chronic   migraines, demyelinating lesions or perhaps vasculitis.      CT Head WO Contrast 5/11/2021  Impression   No acute intracranial abnormality.  MRI may be obtained if clinically   indicated.        Patient denies any eye pain, visual loss . Patient does complain of blurred vision. Patient states that the migraines continue in top of head. Patient admit to being off balanced at times. Patient has a history of MI- 15 yrs ago,  diabetes, hypertension, hyperlipidemia, asthma  Patient admits to mild chest pain at times and sob. No fever or chills.        PAST MEDICAL HISTORY     Past Medical History: Clubs or Organizations:    [employer] or Organization Meetings:     Marital Status:    Intimate Partner Violence:     Fear of Current or Ex-Partner:     Emotionally Abused:     Physically Abused:     Sexually Abused:            REVIEW OF SYSTEMS      Allergies   Allergen Reactions    Morphine Other (See Comments)     Jaw locks up, shakes  Lock jaw    Oxycodone-Acetaminophen     Codeine Nausea And Vomiting       Current Outpatient Medications on File Prior to Encounter   Medication Sig Dispense Refill    lisinopril (PRINIVIL;ZESTRIL) 10 MG tablet Take 1 tablet by mouth daily 30 tablet 11    topiramate (TOPAMAX) 25 MG tablet Take 1 tablet by mouth nightly 30 tablet 3    albuterol sulfate HFA (PROAIR HFA) 108 (90 Base) MCG/ACT inhaler INHALE 2 PUFFS INTO THE LUNGS TWICE DAILY AS NEEDED FOR WHEEZING 8.5 g 3    ARIPiprazole (ABILIFY) 10 MG tablet Take 15 mg by mouth daily       fluticasone (FLONASE) 50 MCG/ACT nasal spray 1 spray by Each Nostril route as needed for Rhinitis      Cetirizine HCl (ZYRTEC ALLERGY PO) Take 1 tablet by mouth daily      atorvastatin (LIPITOR) 40 MG tablet TAKE 1 TABLET BY MOUTH DAILY 30 tablet 0    aspirin 81 MG chewable tablet CHEW AND SWALLOW 1 TABLET BY MOUTH DAILY 30 tablet 0    budesonide-formoterol (SYMBICORT) 160-4.5 MCG/ACT AERO Inhale 2 puffs into the lungs 2 times daily 3 Inhaler 1    metFORMIN (GLUCOPHAGE) 500 MG tablet TAKE 1 TABLET BY MOUTH TWICE DAILY WITH MEALS 60 tablet 11    glucose monitoring kit (FREESTYLE) monitoring kit 1 kit by Does not apply route daily 1 kit 0    rizatriptan (MAXALT) 10 MG tablet Take 1 tablet by mouth once as needed for Migraine May repeat in 2 hours if needed 12 tablet 5    Lancets MISC 1 each by Does not apply route daily 100 each 11    ONETOUCH ULTRA strip USE TO TEST TWICE DAILY AS DIRECTED 200 strip 11     No current facility-administered medications on file prior to encounter.        Negative except for what is mentioned in the HPI. GENERAL PHYSICAL EXAM     Vitals: /88   Pulse 76   Temp 97.1 °F (36.2 °C) (Infrared)   Resp 16   Ht 6' 3\" (1.905 m)   Wt 204 lb (92.5 kg)   SpO2 100%   BMI 25.50 kg/m²  Body mass index is 25.5 kg/m². GENERAL APPEARANCE:   Marija Ray is 48 y.o.,  male, mildly obese, nourished, conscious, alert. Does not appear to be distress or pain at this time. SKIN:  Warm, dry, no cyanosis or jaundice. HEAD:  Normocephalic, atraumatic, no swelling or tenderness. EYES:  Pupils equal, reactive to light. EARS:  No discharge, no marked hearing loss. NOSE:  No rhinorrhea, epistaxis or septal deformity. THROAT:  Not congested. No ulceration bleeding or discharge. NECK:  No stiffness, trachea central.  No palpable masses or L.N.                 CHEST:  Symmetrical and equal on expansion. HEART:  RRR S1 > S2. No audible murmurs or gallops. LUNGS:  Equal on expansion, normal breath sounds. No adventitious sounds. ABDOMEN:  Obese. Soft on palpation. No dysphagia, No localized tenderness. No guarding or rigidity. No palpable hepatosplenomegaly. LYMPHATICS:  No palpable cervical lymphadenopathy. LOCOMOTOR, BACK AND SPINE:  No tenderness or deformities. EXTREMITIES:  Symmetrical, no pretibial edema. Anurags sign negative. No discoloration or ulcerations. NEUROLOGIC:  The patient is conscious, alert, oriented,Cranial nerve II-XII intact, taste and smell were not examined. No apparent focal sensory or motor deficits.              PROVISIONAL DIAGNOSES / SURGERY:      IR LUMBAR PUNCTURE FOR DIAGNOSIS     MRI    intractable migraine without aura    Tremor    Patient Active Problem List    Diagnosis Date Noted    Intractable migraine without aura and without status migrainosus 07/22/2021    Tremor 07/22/2021    Pedal edema 06/26/2018    Primary insomnia 04/26/2018    Gastroesophageal reflux disease with esophagitis 04/26/2018    Type 2 diabetes mellitus with complication ([facility].) 64/08/3348    Essential hypertension 03/15/2018    Mild intermittent asthma without complication 09/02/2267    Hyperlipidemia 04/08/2017           CAROLE PENNY, APRN - CNP on 8/16/2021 at 11:18 AM

## 2021-08-16 NOTE — PROGRESS NOTES
Lumbar area prepped and draped. Numbed and accessed per Dr. Allan Hernandez. 11.5 ml clear Spinal Fluid obtained and sent to lab. Access removed and bandaid placed. Pt tolerated well.

## 2021-08-17 LAB — VDRL CSF SCREEN: NONREACTIVE

## 2021-08-18 LAB
B BURGDORFERI AB,CSF: 0.11 LIV
MYELIN BASIC PROTEIN, CSF: 2.58 NG/ML (ref 0–5.5)

## 2021-08-19 LAB
CSF ISOELECTRIC FOCUSING INTERPRETATION: NORMAL
CULTURE: ABNORMAL
DIRECT EXAM: ABNORMAL
Lab: ABNORMAL
OLIGOCLONAL BANDS NUMBER: 0 BANDS (ref 0–1)
OLIGOCLONAL BANDS: NEGATIVE
SPECIMEN DESCRIPTION: ABNORMAL
WEST NILE IGG, CSF: 0.07 IV
WEST NILE IGM ANTIBODY CSF: 0 IV

## 2021-09-01 ENCOUNTER — OFFICE VISIT (OUTPATIENT)
Dept: PSYCHOLOGY | Age: 56
End: 2021-09-01
Payer: COMMERCIAL

## 2021-09-01 ENCOUNTER — OFFICE VISIT (OUTPATIENT)
Dept: INTERNAL MEDICINE CLINIC | Age: 56
End: 2021-09-01
Payer: COMMERCIAL

## 2021-09-01 VITALS
SYSTOLIC BLOOD PRESSURE: 128 MMHG | BODY MASS INDEX: 24.92 KG/M2 | HEIGHT: 75 IN | WEIGHT: 200.4 LBS | DIASTOLIC BLOOD PRESSURE: 88 MMHG

## 2021-09-01 DIAGNOSIS — F31.61 BIPOLAR DISORDER, CURRENT EPISODE MIXED, MILD (HCC): Primary | ICD-10-CM

## 2021-09-01 DIAGNOSIS — E11.8 TYPE 2 DIABETES MELLITUS WITH COMPLICATION (HCC): Primary | ICD-10-CM

## 2021-09-01 DIAGNOSIS — E78.2 MIXED HYPERLIPIDEMIA: ICD-10-CM

## 2021-09-01 PROCEDURE — 99214 OFFICE O/P EST MOD 30 MIN: CPT | Performed by: INTERNAL MEDICINE

## 2021-09-01 PROCEDURE — 90832 PSYTX W PT 30 MINUTES: CPT | Performed by: SOCIAL WORKER

## 2021-09-01 RX ORDER — ZIPRASIDONE HYDROCHLORIDE 20 MG/1
CAPSULE ORAL
COMMUNITY
Start: 2021-08-31

## 2021-09-01 RX ORDER — EZETIMIBE 10 MG/1
10 TABLET ORAL DAILY
Qty: 30 TABLET | Refills: 3 | Status: SHIPPED | OUTPATIENT
Start: 2021-09-01 | End: 2022-05-11

## 2021-09-01 SDOH — ECONOMIC STABILITY: FOOD INSECURITY: WITHIN THE PAST 12 MONTHS, YOU WORRIED THAT YOUR FOOD WOULD RUN OUT BEFORE YOU GOT MONEY TO BUY MORE.: SOMETIMES TRUE

## 2021-09-01 SDOH — ECONOMIC STABILITY: FOOD INSECURITY: WITHIN THE PAST 12 MONTHS, THE FOOD YOU BOUGHT JUST DIDN'T LAST AND YOU DIDN'T HAVE MONEY TO GET MORE.: SOMETIMES TRUE

## 2021-09-01 ASSESSMENT — SOCIAL DETERMINANTS OF HEALTH (SDOH): HOW HARD IS IT FOR YOU TO PAY FOR THE VERY BASICS LIKE FOOD, HOUSING, MEDICAL CARE, AND HEATING?: HARD

## 2021-09-01 NOTE — PROGRESS NOTES
Subjective:      Patient ID: Giovani Zapien is a 48 y.o. male. HPI patient is here for evaluation multiple medical problems. Ardeth Grade has diabetes, COPD, hypertension. Ardeth Grade is compliant with his diet and medication. He quit smoking already  He mentioned that his shortness of breath is improving  No new complaints     Review of Systems   Constitutional: Negative for activity change, appetite change, chills and diaphoresis. HENT: Negative for congestion, dental problem, ear discharge, facial swelling and hearing loss. Respiratory: Negative for apnea, cough, chest tightness, shortness of breath and wheezing. Cardiovascular: Negative for chest pain and leg swelling. Gastrointestinal: Negative for abdominal distention, abdominal pain, constipation and diarrhea. Genitourinary: Negative for difficulty urinating, dysuria, enuresis, flank pain and frequency. Musculoskeletal: Negative for arthralgias, back pain, gait problem and joint swelling. Skin: Negative for color change, pallor and rash. Neurological: Negative for dizziness, seizures, facial asymmetry, light-headedness, numbness and headaches. Psychiatric/Behavioral: Negative for agitation, behavioral problems, confusion, decreased concentration and dysphoric mood. Objective:   Physical Exam  Vitals and nursing note reviewed. Constitutional:       General: He is not in acute distress. Appearance: He is well-developed. He is not diaphoretic. HENT:      Head: Normocephalic and atraumatic. Mouth/Throat:      Pharynx: No oropharyngeal exudate. Eyes:      General: No scleral icterus. Right eye: No discharge. Left eye: No discharge. Conjunctiva/sclera: Conjunctivae normal.      Pupils: Pupils are equal, round, and reactive to light. Neck:      Thyroid: No thyromegaly. Vascular: No JVD. Trachea: No tracheal deviation. Cardiovascular:      Rate and Rhythm: Normal rate.       Heart sounds: Normal heart sounds. No murmur heard. No gallop. Pulmonary:      Effort: Pulmonary effort is normal. No respiratory distress. Breath sounds: Normal breath sounds. No stridor. No wheezing or rales. Abdominal:      General: Bowel sounds are normal. There is no distension. Palpations: Abdomen is soft. Tenderness: There is no abdominal tenderness. There is no guarding or rebound. Musculoskeletal:         General: No tenderness. Normal range of motion. Cervical back: Normal range of motion and neck supple. Skin:     General: Skin is warm and dry. Findings: No erythema or rash. Neurological:      Mental Status: He is alert and oriented to person, place, and time. Assessment / Plan:   The 10-year ASCVD risk score (Caden Loza, et al., 2013) is: 15.9%    Values used to calculate the score:      Age: 48 years      Sex: Male      Is Non- : No      Diabetic: Yes      Tobacco smoker: No      Systolic Blood Pressure: 803 mmHg      Is BP treated: Yes      HDL Cholesterol: 47 mg/dL      Total Cholesterol: 259 mg/dL     1. Type 2 diabetes mellitus with complication (HCC)  Stable, last HbA1c is 5.8.  - Microalbumin, Ur; Future    2. Mixed hyperlipidemia  On Lipitor  LDL is still very high  - ezetimibe (ZETIA) 10 MG tablet; Take 1 tablet by mouth daily  Dispense: 30 tablet; Refill: 3      · No follow-ups on file. · Reviewed prior labs and health maintenance. · Discussed use, benefit, and side effects of prescribed medications. Barriers to medication compliance addressed. All patient questions answered. Pt voiced understanding. MD MESFIN [facility]  9/6/2021, 3:02 PM    Please note that this chart was generated using voice recognition Dragon dictation software. Although every effort was made to ensure the accuracy of this automated transcription, some errors in transcription may have occurred.      Visit Information    Have you changed or

## 2021-09-06 ASSESSMENT — ENCOUNTER SYMPTOMS
COLOR CHANGE: 0
APNEA: 0
BACK PAIN: 0
ABDOMINAL DISTENTION: 0
CHEST TIGHTNESS: 0
DIARRHEA: 0
WHEEZING: 0
COUGH: 0
FACIAL SWELLING: 0
CONSTIPATION: 0
SHORTNESS OF BREATH: 0
ABDOMINAL PAIN: 0

## 2021-09-21 NOTE — PROGRESS NOTES
ADULT BEHAVIORAL HEALTH FOLLOW UP  LASHAUN Hawkins  Licensed Independent          Visit Date: 9/1/2021   Time of appointment: 1:40 PM      Time spent with Patient: 27 minutes. This is patient's fourth appointment. Reason for Consult:  Depression     PCP:  Monica Pond MD      Pt provided informed consent for the behavioral health program. Discussed with patient model of service to include the limits of confidentiality (i.e. abuse reporting, suicide intervention, etc.) and short-term intervention focused approach. Pt indicated understanding. Cosmo Galeano is a 55 y.o. male who presents for follow up of depression. Marylen Campi reported continued relationship concerns amidst him and current girlfriend. He admitted to his girlfriend that he had seen/known about inappropriate photos she had sent to other men. Marylen Campi shared this has been challenging to cope with and he wants their relationship to work but he fears it will not improve despite working on improving their communication. Since beginning therapy, Marylen Campi has been trying to communicate his feelings with his partner in hopes to maintain a healthier relationship. Marylen Campi shared this has not been successful as he has noticed she continues to seek male attention at work from other employees and has also lied when being addressed about her behavior. Marylen Campi reported he is also pursuing  to assist him in trying to gain custody of his son, as Marylen Campi reported he wants his son to reside with him. Previous Recommendations: F/u appointment scheduled in 3 weeks on 8/27 to check-in with patient on progress in coping with depressive symptoms. Encouraged pt to f/u with his doctor due to reported medication side effects. [facility] call made. MENTAL STATUS EXAM  Mood was dysthymic with calm affect. Suicidal ideation was denied. Homicidal ideation was denied. Hygiene was good . Dress was appropriate.    Behavior was Within Normal Limits with No observation or self-report of difficulties ambulating. Attitude was Engageable. Eye-contact was fair. Speech: rate - WNL, rhythm - WNL, volume - WNL. Verbalizations were coherent. Thought processes were intact and goal-oriented without evidence of delusions, hallucinations, obsessions, or geoff; with little cognitive distortions. Associations were characterized by intact cognitive processes. Pt was oriented to person, place, time, and general circumstances;  recent:  good. Insight and judgment were estimated to be fair, AEB, a fair  understanding of cyclical maladaptive patterns, and the ability to use insight to inform behavior change. ASSESSMENT  Gemma Garcia presented to the appointment today for evaluation and treatment of symptoms of depression. He is currently deemed no risk to himself or others and meets criteria for Bipolar Disorder, current episode mixed. Oneida Dykes is recommended to continue psychotherapy with a clinician whom he can meet with for regularly scheduled counseling appointments, as well as referral to couples counseling due to reported relationship issues with partner that Oneida Dykes wants to work through. Oneida Dykes was in agreement with recommendations. PHQ Scores 6/7/2021 4/22/2021 1/7/2021 5/8/2020 2/5/2020 2/6/2019 6/26/2018   PHQ2 Score 4 4 0 0 0 0 0   PHQ9 Score 15 14 0 0 0 0 0     Interpretation of Total Score Depression Severity: 1-4 = Minimal depression, 5-9 = Mild depression, 10-14 = Moderate depression, 15-19 = Moderately severe depression, 20-27 = Severe depression    How often pt has had thoughts of death or hurting self (if PHQ positive for depression):       No flowsheet data found. Interpretation of LOUIE-7 score: 5-9 = mild anxiety, 10-14 = moderate anxiety, 15+ = severe anxiety. Recommend referral to behavioral health for scores 10 or greater. DIAGNOSIS  Massiel Goldman was seen today for depression.     Diagnoses and all orders for this visit:    Bipolar disorder,

## 2021-10-07 ENCOUNTER — TELEMEDICINE (OUTPATIENT)
Dept: NEUROLOGY | Age: 56
End: 2021-10-07
Payer: COMMERCIAL

## 2021-10-07 DIAGNOSIS — I67.9 CEREBRAL VASCULAR DISEASE: ICD-10-CM

## 2021-10-07 DIAGNOSIS — R25.1 TREMOR: ICD-10-CM

## 2021-10-07 DIAGNOSIS — R56.9 SEIZURE-LIKE ACTIVITY (HCC): Primary | ICD-10-CM

## 2021-10-07 DIAGNOSIS — G43.019 INTRACTABLE MIGRAINE WITHOUT AURA AND WITHOUT STATUS MIGRAINOSUS: ICD-10-CM

## 2021-10-07 PROCEDURE — 99214 OFFICE O/P EST MOD 30 MIN: CPT | Performed by: NURSE PRACTITIONER

## 2021-10-07 RX ORDER — SUMATRIPTAN 100 MG/1
100 TABLET, FILM COATED ORAL
Qty: 9 TABLET | Refills: 3 | Status: SHIPPED | OUTPATIENT
Start: 2021-10-07 | End: 2021-11-16 | Stop reason: ALTCHOICE

## 2021-10-07 RX ORDER — PRIMIDONE 50 MG/1
50 TABLET ORAL 2 TIMES DAILY
Qty: 60 TABLET | Refills: 3 | Status: SHIPPED | OUTPATIENT
Start: 2021-10-07

## 2021-10-07 RX ORDER — TOPIRAMATE 25 MG/1
50 TABLET ORAL 2 TIMES DAILY
Qty: 120 TABLET | Refills: 5 | Status: SHIPPED | OUTPATIENT
Start: 2021-10-07

## 2021-10-07 NOTE — PROGRESS NOTES
[facility]  [address], [place], [address] [facility]  Phone: [phone]  Fax: [phone]    MD Aydin Vitale MD Ahmed B. Jaquita Netter, MD Roxy Bond, FARIBA    TELEHEALTH VISIT        10/7/2021      HISTORY OF PRESENT ILLNESS:       I had the pleasure of seeing Janette Holman, who returns via Telehealth for continued neurologic care. The patient was seen last on July 22, 2021 for treatment of a previous abnormal MRI, migraine headaches and episodes of generalized tremor. The patient had a previous abnormal MRI with concern for a possible demyelinative disease and a lumbar puncture was ordered. There were no oligoclonal bands in his CSF and a demyelinative disease was ruled out and the white matter changes are likely caused by a small vessel disease. For management he is prescribed aspirin 81 mg daily and lipitor. He has risk factors of hypertension, diabetes, dyslipidemia, and previous tobacco use. For prophylaxis of his migraine headaches he is prescribed topamax 25 mg daily. For abortive therapy he is prescribed maxalt 10 mg. He began to notice improvement with the use of topamax initially but is now having a return of his migraine headaches. He denies any side effects to topamax. He has been using maxalt for abortive therapy and notes it is effective but takes some time to work.      Headache location: holoacranila  Headache quality: pounding, throbbing  Associated factors:  nausea , Photophobia, Phonophobia  Intensity: 10/10  Headache chronicity: several years  Headache frequency:  3 headaches/week that can last for several days  Aggravating factors : bright lights, loud sounds  Relieving factors: lying down in a dark room, tylenol 650 mg  Prophylactic medications: topamax 25 mg daily  Abortive medications: tylenol 650 mg, maxalt   Previously used medications: none    The patient also had previous episodes of generalized tremor that occurs throughout his entire body wile he sleeps. A baseline diagnostic sleep study was ordered at his last visit however it was not completed prior to today's visit. The patient's girlfriend was able to take a video of what happens to the patient. He has a history of asthma. The tremor has gotten worse prior to today's visit and notes that it has been interfering with his job. He also admits to shaking of his bilateral legs that happens 3-4 times daily and does not have a trigger. He is accompanied by his girlfriend who reports the patient has an episode of freezing. Testing Reviewed:    MRI Brain WO Contrast 6/9/2021  Impression   1. No acute intracranial abnormality.  No acute infarct. 2.  Scattered foci of T2 FLAIR hyperintensity are seen within the   supratentorial white matter, which are nonspecific. Diagnostic considerations   include early chronic microvascular ischemic changes, sequelae of chronic   migraines, demyelinating lesions or perhaps vasculitis.      CT Head WO Contrast 5/11/2021  Impression   No acute intracranial abnormality.  MRI may be obtained if clinically   indicated.          PAST MEDICAL HISTORY:         Diagnosis Date    Asthma     GERD (gastroesophageal reflux disease)     Heart attack ([facility] [address] 2003    Hyperlipidemia     Hypertension     Type 2 diabetes mellitus (HCC)         PAST SURGICAL HISTORY:         Procedure Laterality Date    APPENDECTOMY      FINGER AMPUTATION Right     5th finger    HAND SURGERY Left     KNEE SURGERY          SOCIAL HISTORY:     Social History     Socioeconomic History    Marital status:      Spouse name: Not on file    Number of children: Not on file    Years of education: Not on file    Highest education level: Not on file   Occupational History    Not on file   Tobacco Use    Smoking status: Former Smoker     Packs/day: 1.50     Years: 15.00     Pack years: 22.50     Types: Cigarettes     Quit date: 11/14/2010     Years since quitting: 10.9    Smokeless tobacco: Never Used   Vaping Use    Vaping Use: Never used   Substance and Sexual Activity    Alcohol use: Never    Drug use: No    Sexual activity: Not on file   Other Topics Concern    Not on file   Social History Narrative    Has 6 children, ages 7-27     Social Determinants of Health     Financial Resource Strain: High Risk    Difficulty of Paying Living Expenses: Hard   Food Insecurity: Food Insecurity Present    Worried About Running Out of Food in the Last Year: Sometimes true    Adrianne of Food in the Last Year: Sometimes true   Transportation Needs:     Lack of Transportation (Medical):      Lack of Transportation (Non-Medical):    Physical Activity:     Days of Exercise per Week:     Minutes of Exercise per Session:    Stress:     Feeling of Stress :    Social Connections:     Frequency of Communication with Friends and Family:     Frequency of Social Gatherings with Friends and Family:     Attends Sabianism Services:     Active Member of Clubs or Organizations:     Attends Club or Organization Meetings:     Marital Status:    Intimate Partner Violence:     Fear of Current or Ex-Partner:     Emotionally Abused:     Physically Abused:     Sexually Abused:        CURRENT MEDICATIONS:     Current Outpatient Medications   Medication Sig Dispense Refill    ziprasidone (GEODON) 20 MG capsule       ezetimibe (ZETIA) 10 MG tablet Take 1 tablet by mouth daily 30 tablet 3    lisinopril (PRINIVIL;ZESTRIL) 10 MG tablet Take 1 tablet by mouth daily 30 tablet 11    topiramate (TOPAMAX) 25 MG tablet Take 1 tablet by mouth nightly 30 tablet 3    rizatriptan (MAXALT) 10 MG tablet Take 1 tablet by mouth once as needed for Migraine May repeat in 2 hours if needed 12 tablet 5    albuterol sulfate HFA (PROAIR HFA) 108 (90 Base) MCG/ACT inhaler INHALE 2 PUFFS INTO THE LUNGS TWICE DAILY AS NEEDED FOR WHEEZING 8.5 g 3    ARIPiprazole (ABILIFY) 10 MG tablet Take 15 mg by mouth daily       fluticasone (FLONASE) 50 MCG/ACT nasal spray 1 spray by Each Nostril route as needed for Rhinitis      Cetirizine HCl (ZYRTEC ALLERGY PO) Take 1 tablet by mouth daily      atorvastatin (LIPITOR) 40 MG tablet TAKE 1 TABLET BY MOUTH DAILY 30 tablet 0    aspirin 81 MG chewable tablet CHEW AND SWALLOW 1 TABLET BY MOUTH DAILY 30 tablet 0    Lancets MISC 1 each by Does not apply route daily 100 each 11    budesonide-formoterol (SYMBICORT) 160-4.5 MCG/ACT AERO Inhale 2 puffs into the lungs 2 times daily 3 Inhaler 1    metFORMIN (GLUCOPHAGE) 500 MG tablet TAKE 1 TABLET BY MOUTH TWICE DAILY WITH MEALS 60 tablet 11    ONETOUCH ULTRA strip USE TO TEST TWICE DAILY AS DIRECTED 200 strip 11    glucose monitoring kit (FREESTYLE) monitoring kit 1 kit by Does not apply route daily 1 kit 0     No current facility-administered medications for this visit. ALLERGIES:     Allergies   Allergen Reactions    Morphine Other (See Comments)     Jaw locks up, shakes  Lock jaw    Oxycodone-Acetaminophen     Codeine Nausea And Vomiting                             REVIEW OF SYSTEMS                   All items selected indicate a positive finding. Those items not selected are negative.   Constitutional [] Weight loss/gain   [] Fatigue  [] Fever/Chills   HEENT [] Hearing Loss  [] Visual Disturbance  [] Tinnitus  [] Eye pain   Respiratory [] Shortness of Breath  [] Cough  [] Snoring   Cardiovascular [] Chest Pain  [] Palpitations  [] Lightheaded   GI [] Constipation  [] Diarrhea  [] Swallowing change  [x] Nausea/vomiting    [] Urinary Frequency  [] Urinary Urgency   Musculoskeletal [] Neck pain  [] Back pain  [] Muscle pain  [] Restless legs   Dermatologic [] Skin changes   Neurologic [] Memory loss/confusion  [] Seizures  [] Trouble walking or imbalance  [] Dizziness  [] Sleep disturbance  [] Weakness  [x] Numbness  [x] Tremors  [] Speech Difficulty  [x] Headaches  [x] Light Sensitivity  [x] Sound Sensitivity Endocrinology []Excessive thirst  []Excessive hunger   Psychiatric [] Anxiety/Depression  [] Hallucination   Allergy/immunology []Hives/environmental allergies   Hematologic/lymph [] Abnormal bleeding  [] Abnormal bruising          PHYSICAL EXAMINATION:                                         .                                                                                                    General Appearance:  Alert, cooperative, no signs of distress, appears stated age   Head:  Normocephalic, no signs of trauma   Eyes:  Conjunctiva/corneas clear;  eyelids intact   Ears:  Normal external ear and canals   Nose: Nares normal, no drainage    Throat: Lips and tongue normal; teeth normal;  gums normal   Extremities: Extremities normal, no cyanosis, no edema   Skin: Skin color, texture normal, no rashes, no lesions                                     NEUROLOGIC EXAMINATION      Mental status    Alert and oriented x 3; able to follow commands, speech and language intact; no hallucinations or delusions  Fund of information appropriate for level of education    Cranial nerves    II - grossly intact  III, IV, VI  extra-ocular muscles full: no nystagmus, no ptosis   V - normal facial sensation                                                               VII - normal facial symmetry                                                             VIII - intact hearing                                                                             IX, X - symmetrical palate                                                                  XI - symmetrical shoulder shrug                                                       XII - tongue midline without atrophy      Motor function  Normal muscle bulk. Strength at least 5/5 on all 4 extremities, no pronator drift      Sensory function Unable to test      Cerebellar Intact fine motor movement. No involuntary movements or tremors.  No ataxia or dysmetria on finger to nose or heel to shin testing      Reflex function Unable to test      Gait                   normal base and arm swing              ASSESSMENT AND PLAN:     This is a telehealth visit that was performed with the originating site at Patient Location: home and Provider Location of [place], [place]. Verbal consent to participate in video visit was obtained. Pursuant to the emergency declaration under the [facility] waiver authority and the [employer] Act, this Virtual Visit was conducted, with patient's consent, to reduce the patient's risk of exposure to COVID-19 and provide continuity of care for an established/new patient. Services were provided through a video synchronous discussion virtually to substitute for in-person clinic visit. I discussed with the patient the nature of our telehealth visits via interactive/real-time audio/video that:  - I would evaluate the patient and recommend diagnostics and treatments based on my assessment  - Our sessions are not being recorded and that personal health information is protected  - Our team would provide follow up care in person if/when the patient needs it. In summary, your patient, Tacho Mares exhibits the following, with associated plan:    1. Small vessel disease. The patient had a previous abnormal MRI which did show multiple periventricular white matter spots which is concerning for a possible demyelinative disease. He previously had tingling in his bilateral hands and bilateral legs and had episodes of numbness on one side. A lumbar puncture was then ordered which showed no oligoclonal bands. 1. Continue aspirin and lipitor per Dr. Raleigh Leavitt  2. Migraine Headaches, currently getting 3 headache days/week  1. Slowly increase topamax to 50 mg twice daily  2. Start imitrex 100 mg for abortive   3.  Possible new onset focal seizures with shaking of the bilateral legs and episodes of freezing, the patient also has a history of seizures when he was younger and was treated with Dilantin at that time  1. Obtain an EEG  2. Recommended the patient attempt to take a video of his possible focal seizures  4. Episodes of generalized tremor that occurs throughout his entire body and while he sleeps. The patient also notes that he does not feel rested. He notes that he barely sleeps during the night. This has been a problem for most of his life. His evaluation today he does have fine tremor of his upper extremities bilaterally. This affects his job performance  1. Baseline diagnostic sleep study, the patient will complete this in the future  2. Start primidone 50 mg twice daily  3. Return for follow up visit in 4-6 weeks      Signed: [address]    Please note that this chart was generated using voice recognition Dragon dictation software. Although every effort was made to ensure the accuracy of this automated transcription, some errors in transcription may have occurred. Provider Attestation: The documentation recorded by the scribe accurately reflects the service I personally performed and the decisions made by myself. Portions of this note were transcribed by a scribe. I personally performed the history, physical exam, and medical decision-making and confirm the accuracy of the information in the transcribed note. Scribe Attestation:   By signing my name below, Jenniffer Zheng, attest that this documentation has been prepared under the direction and in the presence of Ligia Steele CNP.

## 2021-10-29 ENCOUNTER — HOSPITAL ENCOUNTER (OUTPATIENT)
Dept: NEUROLOGY | Age: 56
Discharge: HOME | End: 2021-10-29
Payer: COMMERCIAL

## 2021-10-29 DIAGNOSIS — R56.9 SEIZURE-LIKE ACTIVITY (HCC): ICD-10-CM

## 2021-10-29 PROCEDURE — 95819 EEG AWAKE AND ASLEEP: CPT

## 2021-10-29 PROCEDURE — 95819 EEG AWAKE AND ASLEEP: CPT | Performed by: PSYCHIATRY & NEUROLOGY

## 2021-10-31 NOTE — PROCEDURES
PATIENT:   Irina Lover  DICTATING PHYSICIAN:  Denita Carmen MD    TECHNIQUE:  22 channels of EEG and 1 channel of EKG were recorded utilizing the International 10/20 System on a patient described as awake and cooperative. CLINICAL HISTORY: This is a 55 y.o. male with The encounter diagnosis was Seizure-like activity ([facility] [address] .  EEG is being performed to evaluate for seizure like activity. EEG FINDINGS:     The waking background activity consists of a symmetric and normally reactive 10 Hz, 20-40uV posteriorly predominant rhythm. Other findings: No areas of focal slowing were noted. No paroxysmal features or epileptiform activity was noted. Activating procedures:   Hyperventilation: No response  Photic stimulation: Normal response    Sleep: Normal stage 1 and stage 2 sleep were noted. IMPRESSION:  This is a normal EEG. Ava Nunez M.D.   Clinical neurophysiologist

## 2021-11-01 NOTE — RESULT ENCOUNTER NOTE
Patient notified. He stated that his sleep study is scheduled for the end of November. He also wanted to let you know that he is seeing cardiology.

## 2021-11-15 RX ORDER — FAMOTIDINE 20 MG/1
20 TABLET, FILM COATED ORAL PRN
COMMUNITY

## 2021-11-16 ENCOUNTER — TELEMEDICINE (OUTPATIENT)
Dept: NEUROLOGY | Age: 56
End: 2021-11-16
Payer: COMMERCIAL

## 2021-11-16 DIAGNOSIS — R25.1 TREMOR: ICD-10-CM

## 2021-11-16 DIAGNOSIS — G43.019 INTRACTABLE MIGRAINE WITHOUT AURA AND WITHOUT STATUS MIGRAINOSUS: Primary | ICD-10-CM

## 2021-11-16 DIAGNOSIS — I67.9 CEREBRAL VASCULAR DISEASE: ICD-10-CM

## 2021-11-16 PROCEDURE — 99214 OFFICE O/P EST MOD 30 MIN: CPT | Performed by: NURSE PRACTITIONER

## 2021-11-16 RX ORDER — UBROGEPANT 100 MG/1
TABLET ORAL
Qty: 10 TABLET | Refills: 5 | Status: SHIPPED | OUTPATIENT
Start: 2021-11-16

## 2021-11-16 NOTE — PROGRESS NOTES
[facility]  [address], [facility], [address] [facility]  Phone: [phone]  Fax: [phone]    MD Yordan Mendoza MD Ahmed B. Sofia Crandall, MD Lynden Bough, MD Cliffton Potter, FARIBA    TELEHEALTH VISIT        11/16/2021      HISTORY OF PRESENT ILLNESS:       I had the pleasure of seeing Belkys Steele, who returns via Telehealth for continued neurologic care. The patient was seen last on October 7, 2021 for treatment of small vessel disease, migraine headaches, possible new onset focal seizures and episodes of generalized tremor. For management of his small vessel disease he is prescribed aspirin and lipitor by his PCP. The patient is here today reporting that he had a heart attack prior to today's visit. He notes that he was at work when he began to have chest pain. He notes that his co-worker noticed that he looked off and he left work by ambulance. He was then evaluated in the hospital who planned a cardiac catheterization however he left the hospital AMA. He notes that he follows with cardiology and plans a cardiac catheterization in the future. He was discharged on     For migraine prophylaxis he is prescribed topamax 50 mg twice daily. For abortive therapy he is prescribed imitrex 100 mg. He reports today that he has been compliant to topamax which has been effective in limiting his migraine headaches. He notes that he has been having headaches at 1-2 headaches/month that are of a decreased intensity.      Headache location: holoacranial   Headache quality: pounding, throbbing  Associated factors:  nausea , Photophobia, Phonophobia  Intensity: 10/10  Headache chronicity: several years  Headache frequency:  infrequent  Aggravating factors : bright lights, loud sounds  Relieving factors: lying down in a dark room, tylenol 650 mg  Prophylactic medications: topamax 50 mg daily  Abortive medications: tylenol 650 mg, imitrex  Previously used medications: none    The patient also has possible focal seizures with shaking of his bilateral legs and episodes of freezing. He also has a history of seizures as a child. An EEG completed prior to today's visit was normal.     The patient is prescribed primidone 50 mg twice daily for management of his generalized tremor. He reports today that he has been compliant to primidone and it has helped to manage his tremor. He notes that he has noticed almost complete resolution of his tremor since starting primidone. Testing Reviewed:    MRI Brain WO Contrast 2021  Impression   1. No acute intracranial abnormality.  No acute infarct. 2.  Scattered foci of T2 FLAIR hyperintensity are seen within the   supratentorial white matter, which are nonspecific.  Diagnostic considerations   include early chronic microvascular ischemic changes, sequelae of chronic   migraines, demyelinating lesions or perhaps vasculitis.      CT Head WO Contrast 2021  Impression   No acute intracranial abnormality.  MRI may be obtained if clinically   indicated.               PAST MEDICAL HISTORY:         Diagnosis Date    Asthma     GERD (gastroesophageal reflux disease)     Heart attack ([facility] [address] 2003    Hyperlipidemia     Hypertension     Type 2 diabetes mellitus ([facility]         PAST SURGICAL HISTORY:         Procedure Laterality Date    APPENDECTOMY      FINGER AMPUTATION Right     5th finger    HAND SURGERY Left     KNEE SURGERY          SOCIAL HISTORY:     Social History     Socioeconomic History    Marital status:      Spouse name: Not on file    Number of children: Not on file    Years of education: Not on file    Highest education level: Not on file   Occupational History    Not on file   Tobacco Use    Smoking status: Former Smoker     Packs/day: 1.50     Years: 15.00     Pack years: 22.50     Types: Cigarettes     Quit date: 2010     Years since quittin.0    Smokeless tobacco: Never Used Vaping Use    Vaping Use: Never used   Substance and Sexual Activity    Alcohol use: Never    Drug use: No    Sexual activity: Not on file   Other Topics Concern    Not on file   Social History Narrative    Has 6 children, ages 7-27     Social Determinants of Health     Financial Resource Strain: High Risk    Difficulty of Paying Living Expenses: Hard   Food Insecurity: Food Insecurity Present    Worried About Running Out of Food in the Last Year: Sometimes true    Adrianne of Food in the Last Year: Sometimes true   Transportation Needs:     Lack of Transportation (Medical): Not on file    Lack of Transportation (Non-Medical):  Not on file   Physical Activity:     Days of Exercise per Week: Not on file    Minutes of Exercise per Session: Not on file   Stress:     Feeling of Stress : Not on file   Social Connections:     Frequency of Communication with Friends and Family: Not on file    Frequency of Social Gatherings with Friends and Family: Not on file    Attends [employer] Services: Not on file    Active Member of [address] [place] [address] or Organizations: Not on file    Attends Club or Organization Meetings: Not on file    Marital Status: Not on file   Intimate Partner Violence:     Fear of Current or Ex-Partner: Not on file    Emotionally Abused: Not on file    Physically Abused: Not on file    Sexually Abused: Not on file   Housing Stability:     Unable to Pay for Housing in the Last Year: Not on file    Number of [place] in the Last Year: Not on file    Unstable Housing in the Last Year: Not on file       CURRENT MEDICATIONS:     Current Outpatient Medications   Medication Sig Dispense Refill    Ubrogepant (UBRELVY) 100 MG TABS Take one tablet at onset of migraine and may repeat in 2 hours if needed 10 tablet 5    famotidine (PEPCID) 20 MG tablet Take 20 mg by mouth as needed      topiramate (TOPAMAX) 25 MG tablet Take 2 tablets by mouth 2 times daily 120 tablet 5    primidone (MYSOLINE) 50 MG tablet Take 1 tablet by mouth 2 times daily 60 tablet 3    ziprasidone (GEODON) 20 MG capsule       ezetimibe (ZETIA) 10 MG tablet Take 1 tablet by mouth daily 30 tablet 3    lisinopril (PRINIVIL;ZESTRIL) 10 MG tablet Take 1 tablet by mouth daily 30 tablet 11    albuterol sulfate HFA (PROAIR HFA) 108 (90 Base) MCG/ACT inhaler INHALE 2 PUFFS INTO THE LUNGS TWICE DAILY AS NEEDED FOR WHEEZING 8.5 g 3    Cetirizine HCl (ZYRTEC ALLERGY PO) Take 1 tablet by mouth daily      atorvastatin (LIPITOR) 40 MG tablet TAKE 1 TABLET BY MOUTH DAILY 30 tablet 0    aspirin 81 MG chewable tablet CHEW AND SWALLOW 1 TABLET BY MOUTH DAILY 30 tablet 0    Lancets MISC 1 each by Does not apply route daily 100 each 11    budesonide-formoterol (SYMBICORT) 160-4.5 MCG/ACT AERO Inhale 2 puffs into the lungs 2 times daily 3 Inhaler 1    metFORMIN (GLUCOPHAGE) 500 MG tablet TAKE 1 TABLET BY MOUTH TWICE DAILY WITH MEALS 60 tablet 11    ONETOUCH ULTRA strip USE TO TEST TWICE DAILY AS DIRECTED 200 strip 11    glucose monitoring kit (FREESTYLE) monitoring kit 1 kit by Does not apply route daily 1 kit 0    ARIPiprazole (ABILIFY) 10 MG tablet Take 15 mg by mouth daily  (Patient not taking: Reported on 11/15/2021)      fluticasone (FLONASE) 50 MCG/ACT nasal spray 1 spray by Each Nostril route as needed for Rhinitis (Patient not taking: Reported on 11/15/2021)       No current facility-administered medications for this visit. ALLERGIES:     Allergies   Allergen Reactions    Morphine Other (See Comments)     Jaw locks up, shakes  Lock jaw    Oxycodone-Acetaminophen     Codeine Nausea And Vomiting                             REVIEW OF SYSTEMS                   All items selected indicate a positive finding. Those items not selected are negative.   Constitutional [] Weight loss/gain   [] Fatigue  [] Fever/Chills   HEENT [] Hearing Loss  [] Visual Disturbance  [] Tinnitus  [] Eye pain   Respiratory [] Shortness of Breath  [] Cough  [] Snoring   Cardiovascular [x] Chest Pain  [] Palpitations  [] Lightheaded   GI [] Constipation  [] Diarrhea  [] Swallowing change  [] Nausea/vomiting    [] Urinary Frequency  [] Urinary Urgency   Musculoskeletal [] Neck pain  [] Back pain  [] Muscle pain  [] Restless legs   Dermatologic [] Skin changes   Neurologic [] Memory loss/confusion  [] Seizures  [] Trouble walking or imbalance  [] Dizziness  [] Sleep disturbance  [] Weakness  [x] Numbness  [x] Tremors  [] Speech Difficulty  [x] Headaches  [x] Light Sensitivity  [x] Sound Sensitivity   Endocrinology []Excessive thirst  []Excessive hunger   Psychiatric [] Anxiety/Depression  [] Hallucination   Allergy/immunology []Hives/environmental allergies   Hematologic/lymph [] Abnormal bleeding  [] Abnormal bruising          PHYSICAL EXAMINATION:                                         .                                                                                                    General Appearance:  Alert, cooperative, no signs of distress, appears stated age   Head:  Normocephalic, no signs of trauma   Eyes:  Conjunctiva/corneas clear;  eyelids intact   Ears:  Normal external ear and canals   Nose: Nares normal, no drainage    Throat: Lips and tongue normal; teeth normal;  gums normal   Extremities: Extremities normal, no cyanosis, no edema   Skin: Skin color, texture normal, no rashes, no lesions                                     NEUROLOGIC EXAMINATION      Mental status    Alert and oriented x 3; able to follow commands, speech and language intact; no hallucinations or delusions  Fund of information appropriate for level of education    Cranial nerves    II - grossly intact  III, IV, VI  extra-ocular muscles full: no nystagmus, no ptosis   V - normal facial sensation                                                               VII - normal facial symmetry                                                             VIII - intact hearing IX, X - symmetrical palate                                                                  XI - symmetrical shoulder shrug                                                       XII - tongue midline without atrophy      Motor function  Normal muscle bulk. Strength at least 5/5 on all 4 extremities, no pronator drift      Sensory function Unable to test      Cerebellar Intact fine motor movement. No involuntary movements or tremors. No ataxia or dysmetria on finger to nose or heel to shin testing      Reflex function Unable to test      Gait                   normal base and arm swing              ASSESSMENT AND PLAN:     This is a telehealth visit that was performed with the originating site at Patient Location: home and Provider Location of [place], [place]. Verbal consent to participate in video visit was obtained. Pursuant to the emergency declaration under the [facility] waiver authority and the [employer] Act, this Virtual Visit was conducted, with patient's consent, to reduce the patient's risk of exposure to COVID-19 and provide continuity of care for an established/new patient. Services were provided through a video synchronous discussion virtually to substitute for in-person clinic visit. I discussed with the patient the nature of our telehealth visits via interactive/real-time audio/video that:  - I would evaluate the patient and recommend diagnostics and treatments based on my assessment  - Our sessions are not being recorded and that personal health information is protected  - Our team would provide follow up care in person if/when the patient needs it. In summary, your patient, Irina Penn exhibits the following, with associated plan:    1. Small vessel disease.  The patient had a previous abnormal MRI which did show multiple periventricular white matter spots which is concerning for a possible demyelinative disease. He previously had tingling in his bilateral hands and bilateral legs and had episodes of numbness on one side. A lumbar puncture was then ordered which showed no oligoclonal bands. 1. Continue aspirin and lipitor per Dr. Stephenson Staff  2. Migraine Headaches, currently controlled with use of topamax  1. Continue topamax 50 mg twice daily  2. Start Ubrelvy 100 mg for abortive therapy as triptans are contraindicated due to a recent myocardial infarction  3. Possible new onset focal seizures with shaking of the bilateral legs and episodes of freezing, the patient also has a history of seizures when he was younger and was treated with Dilantin at that time  1. Recent EEG was normal  4. Episodes of generalized tremor which have resolved. The tremor occurs throughout his entire body and while he sleeps.  The patient also notes that he does not feel rested. Deborah Dobbs notes that he barely sleeps during the night.  This has been a problem for most of his life. He notes that his tremor has improved with the use of primidone  1. Continue primidone 50 mg twice daily  2. Return for follow up visit in 6 months      Signed: VANNA Ragsdale    [address]    Please note that this chart was generated using voice recognition Dragon dictation software. Although every effort was made to ensure the accuracy of this automated transcription, some errors in transcription may have occurred. Provider Attestation: The documentation recorded by the scribe accurately reflects the service I personally performed and the decisions made by myself. Portions of this note were transcribed by a scribe. I personally performed the history, physical exam, and medical decision-making and confirm the accuracy of the information in the transcribed note.      Scribe Attestation:   By signing my name below, I, Marguerite Castro, attest that this documentation has been prepared under the direction and in the presence of Ana Maria Espitia CNP.

## 2022-04-04 DIAGNOSIS — J45.20 MILD INTERMITTENT ASTHMA WITHOUT COMPLICATION: ICD-10-CM

## 2022-04-04 RX ORDER — ALBUTEROL SULFATE 90 UG/1
AEROSOL, METERED RESPIRATORY (INHALATION)
Qty: 8.5 G | Refills: 3 | Status: SHIPPED | OUTPATIENT
Start: 2022-04-04

## 2022-05-11 DIAGNOSIS — E78.2 MIXED HYPERLIPIDEMIA: ICD-10-CM

## 2022-05-11 RX ORDER — EZETIMIBE 10 MG/1
TABLET ORAL
Qty: 30 TABLET | Refills: 3 | Status: SHIPPED | OUTPATIENT
Start: 2022-05-11

## 2024-05-08 ENCOUNTER — OUTSIDE PLACE OF SERVICE (OUTPATIENT)
Dept: GASTROENTEROLOGY | Facility: CLINIC | Age: 59
End: 2024-05-08

## 2024-05-08 PROCEDURE — 99204 OFFICE O/P NEW MOD 45 MIN: CPT | Mod: FS,,, | Performed by: INTERNAL MEDICINE

## 2024-05-09 ENCOUNTER — OUTSIDE PLACE OF SERVICE (OUTPATIENT)
Dept: GASTROENTEROLOGY | Facility: CLINIC | Age: 59
End: 2024-05-09

## 2024-05-09 LAB — CRC RECOMMENDATION EXT: NORMAL

## 2024-05-09 PROCEDURE — 45385 COLONOSCOPY W/LESION REMOVAL: CPT | Mod: ,,, | Performed by: INTERNAL MEDICINE

## 2024-05-09 PROCEDURE — 45380 COLONOSCOPY AND BIOPSY: CPT | Mod: 59,,, | Performed by: INTERNAL MEDICINE

## 2024-05-14 ENCOUNTER — TELEPHONE (OUTPATIENT)
Dept: GASTROENTEROLOGY | Facility: CLINIC | Age: 59
End: 2024-05-14

## 2024-05-15 NOTE — TELEPHONE ENCOUNTER
Called and left patient a voicemail to return my call to give him his results.  [facility]

## 2024-05-15 NOTE — TELEPHONE ENCOUNTER
Periapp lesion Bx: TA, 1 TA, DCBx benign.    Notify patient that his colon polyp was benign.  The biopsies taken from the inflamed part of the colon were also benign.  He had a polyp at the very beginning part of his colon that was sampled and returned benign but he will need it removed via another colonoscopy.  Recommend discontinuation of tobacco use.  He is to establish with a primary healthcare provider who needs to place outpatient Gastroenterology referral for repeat colonoscopy.  If he has established with a primary healthcare provider then send his consultation note, colonoscopy report, pathology report and these notes.  NBP

## 2024-05-16 NOTE — TELEPHONE ENCOUNTER
Called and spoke with patient gave him his results he did give me the name of hid knew PCP and it is Tricia Lane and she is at [facility] and he will be seeing her on tomorrow so I did fax over all his notes and results to her for his appt on tomorrow and I also told him about the GI referral.  [facility]

## 2024-06-07 ENCOUNTER — DOCUMENTATION ONLY (OUTPATIENT)
Dept: GASTROENTEROLOGY | Facility: CLINIC | Age: 59
End: 2024-06-07